# Patient Record
Sex: MALE | Race: BLACK OR AFRICAN AMERICAN | NOT HISPANIC OR LATINO | Employment: OTHER | URBAN - METROPOLITAN AREA
[De-identification: names, ages, dates, MRNs, and addresses within clinical notes are randomized per-mention and may not be internally consistent; named-entity substitution may affect disease eponyms.]

---

## 2018-07-09 ENCOUNTER — HOSPITAL ENCOUNTER (OUTPATIENT)
Dept: ADMINISTRATIVE | Facility: HOSPITAL | Age: 73
End: 2018-07-12
Attending: INTERNAL MEDICINE | Admitting: INTERNAL MEDICINE

## 2018-07-09 LAB
ABS NEUT (OLG): 3.74 X10(3)/MCL (ref 2.1–9.2)
ALBUMIN SERPL-MCNC: 3.2 GM/DL (ref 3.4–5)
ALBUMIN/GLOB SERPL: 1 {RATIO}
ALP SERPL-CCNC: 63 UNIT/L (ref 45–117)
ALT SERPL-CCNC: 43 UNIT/L (ref 16–61)
APTT PPP: 23.1 SECOND(S) (ref 21–30)
AST SERPL-CCNC: 48 UNIT/L (ref 15–37)
BASOPHILS # BLD AUTO: 0.03 X10(3)/MCL (ref 0–0.2)
BASOPHILS NFR BLD AUTO: 0.5 % (ref 0–0.9)
BILIRUB SERPL-MCNC: 1 MG/DL (ref 0.2–1)
BILIRUBIN DIRECT+TOT PNL SERPL-MCNC: 0.4 MG/DL (ref 0–0.2)
BILIRUBIN DIRECT+TOT PNL SERPL-MCNC: 0.6 MG/DL (ref 0–1)
BUN SERPL-MCNC: 8 MG/DL (ref 7–18)
CALCIUM SERPL-MCNC: 8.3 MG/DL (ref 8.5–10.1)
CHLORIDE SERPL-SCNC: 105 MMOL/L (ref 98–107)
CO2 SERPL-SCNC: 23 MMOL/L (ref 21–32)
CREAT SERPL-MCNC: 0.94 MG/DL (ref 0.7–1.3)
EOSINOPHIL # BLD AUTO: 0.21 X10(3)/MCL (ref 0–0.9)
EOSINOPHIL NFR BLD AUTO: 3.3 % (ref 0–6.5)
ERYTHROCYTE [DISTWIDTH] IN BLOOD BY AUTOMATED COUNT: 15.5 % (ref 11.5–17)
GLOBULIN SER-MCNC: 4 GM/DL (ref 2–4)
GLUCOSE SERPL-MCNC: 104 MG/DL (ref 74–106)
HCT VFR BLD AUTO: 43 % (ref 42–52)
HEMOCCULT SP1 STL QL: POSITIVE
HGB BLD-MCNC: 14.8 GM/DL (ref 14–18)
IMM GRANULOCYTES # BLD AUTO: 0.04 10*3/UL (ref 0–0.02)
IMM GRANULOCYTES NFR BLD AUTO: 0.6 % (ref 0–0.43)
INR PPP: 1.1 (ref 2–3)
LYMPHOCYTES # BLD AUTO: 1.61 X10(3)/MCL (ref 0.6–4.6)
LYMPHOCYTES NFR BLD AUTO: 25 % (ref 16.2–38.3)
MCH RBC QN AUTO: 28.6 PG (ref 27–31)
MCHC RBC AUTO-ENTMCNC: 34.4 GM/DL (ref 33–36)
MCV RBC AUTO: 83 FL (ref 80–94)
MONOCYTES # BLD AUTO: 0.82 X10(3)/MCL (ref 0.1–1.3)
MONOCYTES NFR BLD AUTO: 12.7 % (ref 4.7–11.3)
NEUTROPHILS # BLD AUTO: 3.74 X10(3)/MCL (ref 2.1–9.2)
NEUTROPHILS NFR BLD AUTO: 57.9 % (ref 49.1–73.4)
NRBC BLD AUTO-RTO: 0 % (ref 0–0.2)
PLATELET # BLD AUTO: 135 X10(3)/MCL (ref 130–400)
PMV BLD AUTO: 9.7 FL (ref 7.4–10.4)
POTASSIUM SERPL-SCNC: 3.8 MMOL/L (ref 3.5–5.1)
PROT SERPL-MCNC: 7 GM/DL (ref 6.4–8.2)
PROTHROMBIN TIME: 11 SECOND(S) (ref 9.3–11.4)
RBC # BLD AUTO: 5.18 X10(6)/MCL (ref 4.7–6.1)
SODIUM SERPL-SCNC: 136 MMOL/L (ref 136–145)
WBC # SPEC AUTO: 6.4 X10(3)/MCL (ref 4.5–11.5)

## 2018-07-10 LAB
ABS NEUT (OLG): 2.8 X10(3)/MCL (ref 2.1–9.2)
ANISOCYTOSIS BLD QL SMEAR: 2
EOSINOPHIL NFR BLD MANUAL: 4 % (ref 0–8)
ERYTHROCYTE [DISTWIDTH] IN BLOOD BY AUTOMATED COUNT: 15.8 % (ref 11.5–17)
HCT VFR BLD AUTO: 41.2 % (ref 42–52)
HGB BLD-MCNC: 13.7 GM/DL (ref 14–18)
LYMPHOCYTES NFR BLD MANUAL: 22 % (ref 13–40)
MACROCYTES BLD QL SMEAR: 2
MCH RBC QN AUTO: 28.9 PG (ref 27–31)
MCHC RBC AUTO-ENTMCNC: 33.3 GM/DL (ref 33–36)
MCV RBC AUTO: 86.9 FL (ref 80–94)
MONOCYTES NFR BLD MANUAL: 10 % (ref 2–11)
NEUTROPHILS NFR BLD MANUAL: 64 % (ref 47–80)
PLATELET # BLD AUTO: 116 X10(3)/MCL (ref 130–400)
PLATELET # BLD EST: NORMAL 10*3/UL
PLATELET # BLD EST: NORMAL 10*3/UL
PMV BLD AUTO: 10.1 FL (ref 7.4–10.4)
RBC # BLD AUTO: 4.74 X10(6)/MCL (ref 4.7–6.1)
WBC # SPEC AUTO: 5.4 X10(3)/MCL (ref 4.5–11.5)

## 2018-07-11 LAB
ABS NEUT (OLG): 2.08 X10(3)/MCL (ref 2.1–9.2)
BASOPHILS # BLD AUTO: 0 X10(3)/MCL (ref 0–0.2)
BASOPHILS NFR BLD AUTO: 1 %
EOSINOPHIL # BLD AUTO: 0.2 X10(3)/MCL (ref 0–0.9)
EOSINOPHIL NFR BLD AUTO: 4 %
ERYTHROCYTE [DISTWIDTH] IN BLOOD BY AUTOMATED COUNT: 15.6 % (ref 11.5–17)
HCT VFR BLD AUTO: 38.2 % (ref 42–52)
HGB BLD-MCNC: 12.4 GM/DL (ref 14–18)
LYMPHOCYTES # BLD AUTO: 1.5 X10(3)/MCL (ref 0.6–4.6)
LYMPHOCYTES NFR BLD AUTO: 33 %
MCH RBC QN AUTO: 28.2 PG (ref 27–31)
MCHC RBC AUTO-ENTMCNC: 32.5 GM/DL (ref 33–36)
MCV RBC AUTO: 87 FL (ref 80–94)
MONOCYTES # BLD AUTO: 0.6 X10(3)/MCL (ref 0.1–1.3)
MONOCYTES NFR BLD AUTO: 14 %
NEUTROPHILS # BLD AUTO: 2.08 X10(3)/MCL (ref 2.1–9.2)
NEUTROPHILS NFR BLD AUTO: 47 %
PLATELET # BLD AUTO: 105 X10(3)/MCL (ref 130–400)
PMV BLD AUTO: 10.6 FL (ref 9.4–12.4)
RBC # BLD AUTO: 4.39 X10(6)/MCL (ref 4.7–6.1)
WBC # SPEC AUTO: 4.4 X10(3)/MCL (ref 4.5–11.5)

## 2018-07-12 LAB
ABS NEUT (OLG): 2.7 X10(3)/MCL (ref 2.1–9.2)
BASOPHILS # BLD AUTO: 0 X10(3)/MCL (ref 0–0.2)
BASOPHILS NFR BLD AUTO: 0 %
EOSINOPHIL # BLD AUTO: 0.2 X10(3)/MCL (ref 0–0.9)
EOSINOPHIL NFR BLD AUTO: 4 %
ERYTHROCYTE [DISTWIDTH] IN BLOOD BY AUTOMATED COUNT: 15.7 % (ref 11.5–17)
HCT VFR BLD AUTO: 37.4 % (ref 42–52)
HGB BLD-MCNC: 12.2 GM/DL (ref 14–18)
LYMPHOCYTES # BLD AUTO: 1 X10(3)/MCL (ref 0.6–4.6)
LYMPHOCYTES NFR BLD AUTO: 22 %
MCH RBC QN AUTO: 28.5 PG (ref 27–31)
MCHC RBC AUTO-ENTMCNC: 32.6 GM/DL (ref 33–36)
MCV RBC AUTO: 87.4 FL (ref 80–94)
MONOCYTES # BLD AUTO: 0.7 X10(3)/MCL (ref 0.1–1.3)
MONOCYTES NFR BLD AUTO: 15 %
NEUTROPHILS # BLD AUTO: 2.7 X10(3)/MCL (ref 2.1–9.2)
NEUTROPHILS NFR BLD AUTO: 57 %
PLATELET # BLD AUTO: 107 X10(3)/MCL (ref 130–400)
PMV BLD AUTO: 10.6 FL (ref 9.4–12.4)
RBC # BLD AUTO: 4.28 X10(6)/MCL (ref 4.7–6.1)
WBC # SPEC AUTO: 4.7 X10(3)/MCL (ref 4.5–11.5)

## 2021-11-04 ENCOUNTER — HISTORICAL (OUTPATIENT)
Dept: ADMINISTRATIVE | Facility: HOSPITAL | Age: 76
End: 2021-11-04

## 2021-11-04 LAB
ALBUMIN SERPL-MCNC: 3.4 GM/DL (ref 3.4–4.8)
ALBUMIN/GLOB SERPL: 1 RATIO (ref 1.1–2)
ALP SERPL-CCNC: 67 UNIT/L (ref 40–150)
ALT SERPL-CCNC: 29 UNIT/L (ref 0–55)
AST SERPL-CCNC: 42 UNIT/L (ref 5–34)
BILIRUB SERPL-MCNC: 0.7 MG/DL
BILIRUBIN DIRECT+TOT PNL SERPL-MCNC: 0.3 MG/DL (ref 0–0.5)
BILIRUBIN DIRECT+TOT PNL SERPL-MCNC: 0.4 MG/DL (ref 0–0.8)
BUN SERPL-MCNC: 11.8 MG/DL (ref 8.4–25.7)
CALCIUM SERPL-MCNC: 9.1 MG/DL (ref 8.7–10.5)
CHLORIDE SERPL-SCNC: 104 MMOL/L (ref 98–107)
CO2 SERPL-SCNC: 27 MMOL/L (ref 23–31)
CREAT SERPL-MCNC: 0.71 MG/DL (ref 0.73–1.18)
ERYTHROCYTE [DISTWIDTH] IN BLOOD BY AUTOMATED COUNT: 14.4 % (ref 11.5–17)
GLOBULIN SER-MCNC: 3.4 GM/DL (ref 2.4–3.5)
GLUCOSE SERPL-MCNC: 97 MG/DL (ref 82–115)
HCT VFR BLD AUTO: 42.7 % (ref 42–52)
HGB BLD-MCNC: 14.2 GM/DL (ref 14–18)
MCH RBC QN AUTO: 28.5 PG (ref 27–31)
MCHC RBC AUTO-ENTMCNC: 33.3 GM/DL (ref 33–36)
MCV RBC AUTO: 85.7 FL (ref 80–94)
PLATELET # BLD AUTO: 146 X10(3)/MCL (ref 130–400)
PMV BLD AUTO: 11.1 FL (ref 9.4–12.4)
POTASSIUM SERPL-SCNC: 4.5 MMOL/L (ref 3.5–5.1)
PROT SERPL-MCNC: 6.8 GM/DL (ref 5.8–7.6)
RBC # BLD AUTO: 4.98 X10(6)/MCL (ref 4.7–6.1)
SARS-COV-2 RNA RESP QL NAA+PROBE: NOT DETECTED
SODIUM SERPL-SCNC: 139 MMOL/L (ref 136–145)
WBC # SPEC AUTO: 4.5 X10(3)/MCL (ref 4.5–11.5)

## 2021-11-08 ENCOUNTER — HISTORICAL (OUTPATIENT)
Dept: ADMINISTRATIVE | Facility: HOSPITAL | Age: 76
End: 2021-11-08
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
## 2022-04-30 NOTE — DISCHARGE SUMMARY
Patient:   Charu Cancino            MRN: [id number]            FIN: [id number]               Age:   73 years     Sex:  Male     :  1945   Associated Diagnoses:   None   Author:   Tripp Costa MD      Discharge Information      Discharge Summary Information   ADMIT/DISCHARGE DATE   Admit Date: 2018  Discharge Date: 2018     Physicians   Attending Physician - Igor CHRISTIANSON, Tripp  Consulting Physician - Taina CHRISTIANSON, Toro MARRERO  Consulting Physician - Pop CHRISTIANSON, Marcelo PHAM  Consulting Physician - Haile CHRISTIANSON, Song CORREIA       Discharge Diagnosis   Hematochezia, Likely Lower GI bleed, resolved      Mild bradycardia, asymptomatic      Hypertension, benign      Hyperlipidemia      CAD       Procedures   2018 - [id number] - Polypectomy  07/10/2018 - [id number] - Esophagogastroduodenoscopy  2018 -  - Colonoscopy  2018 -  - Biopsy Gastrointestinal     Immunizations   No immunizations recorded for this visit.     Discharge Medications   Continue  aspirin (aspirin 81 mg oral tablet), Daily  atorvastatin (atorvastatin 40 mg oral tablet) 80 mg, Oral, Daily  brimonidine-timolol ophthalmic (COMBIGAN EYE DROPS) 1 drop(s), Eye-Both, q12hr  carvedilol (Coreg 3.125 mg oral tablet) 3.125 mg, Oral, BIDWMeal  lisinopril (LISINOPRIL   TAB 20MG) 20 mg, Oral, Daily  omeprazole (omeprazole 40 mg oral DR capsule) 40 mg, Oral, Daily  ranolazine (RANEXA       TAB 500MG) 500 mg, Oral, BID  Discontinue  acetaminophen-HYDROcodone (HYDROCO/APAP TAB 5-325MG)  carvedilol (CARVEDILOL   TAB 6.25MG)  cyclobenzaprine (CYCLOBENZAPRINE 10 MG TABLET) 10 mg, Oral, TID  finasteride (finasteride 5 mg oral tablet), Daily  metoprolol (metoprolol tartrate 50 mg oral tab)  ranolazine (Ranexa 1000 mg oral tablet, extended release), BID  sucralfate (sucralfate 1 g oral tablet) 1 gm, Oral, QID  ticagrelor (BRILINTA     TAB 60MG) 60 mg, Oral, BID  ticagrelor (Brilinta 90 mg oral  tablet) 1 tab, Oral, BID        Education   Discharge - Home, Give all scheduled vaccinations prior to discharge.   Discharge Activity - Activity as Tolerated   Discharge Diet - Regular         Followup   Song Be, on 07/16/2018        Hospital Course   Hospital Course   73-year-old black male who is on Brillinta and aspirin with a history of CAD presented to the emergency department at [facility] due to rectal bleeding that started this morning with no previous history.  Patient was transferred to [facility] for GI services.  Patient reported a total of 4 large bloody bowel movements on day of admit with minimal stool.  Reported that the blood is bright red.  Per ED note, rectal exam revealed gross blood.  H&H was stable.  Patient was accepted to hospital medicine services for medical management and GI consultation. H&H & BMs were monitored. EGD on 7/10 was negative for acute findings. Colonoscopy was done on 7/11/18 and a large polyp suspicious for malignancy was seen abd biopsied. Pt was started on po diet. Stool was non bloody and H&H has been stable. CT abdomen was done and did not show any acute findings. Pt was seen by surgery team. After discussions with the family Dr Be, colorectal surgeon recommend out pt F/U in his clinic.   Pt stable and asymptomatic. Dc to home today.  Diet: Cardiac  Meds: Per dc med rec  F/U with Dr Be as scheduled  For further questions contact hospitalist office  DC 31 mts   .        Physical Examination   General:  Alert and oriented, No acute distress.    Respiratory:  Lungs are clear to auscultation, Breath sounds are equal.    Cardiovascular:  Normal rate, Regular rhythm, No murmur.    Gastrointestinal:  Soft, Non-tender, Non-distended, Normal bowel sounds.    Neurologic:  Alert, Oriented, No focal deficits, Cranial Nerves II-XII are grossly intact.       Results Review   General results   Today's results   7/12/2018 3:32 CDT        WBC                       4.7 x10(3)/mcL                             RBC                       4.28 x10(6)/mcL  LOW                             Hgb                       12.2 gm/dL  LOW                             Hct                       37.4 %  LOW                             Platelet                  107 x10(3)/mcL  LOW                             MCV                       87.4 fL                             MCH                       28.5 pg                             MCHC                      32.6 gm/dL  LOW                             RDW                       15.7 %                             MPV                       10.6 fL                             Abs Neut                  2.70 x10(3)/mcL                             Neutro Auto               57 %  NA                             Lymph Auto                22 %  NA                             Mono Auto                 15 %  NA                             Eos Auto                  4 %  NA                             Abs Eos                   0.2 x10(3)/mcL                             Basophil Auto             0 %  NA                             Abs Neutro                2.70 x10(3)/mcL                             Abs Lymph                 1.0 x10(3)/mcL                             Abs Mono                  0.7 x10(3)/mcL                             Abs Baso                  0.0 x10(3)/mcL        Discharge Plan   Education and Follow-up   Counseled: patient, regarding diagnosis, regarding treatment, regarding medications.

## 2022-04-30 NOTE — CONSULTS
Patient:   Charu Cancino            MRN: [id number]            FIN: [id number]               Age:   73 years     Sex:  Male     :  1945   Associated Diagnoses:   None   Author:   Shefali Figueroa      Chief Complaint   We have been consulted by Dr. Costa to evaluate this patient for hematochezia.       History of Present Illness   Mr. Cancino is a 73-year-old AAM unknown to our group.  He has a history of CAD, MI, hypercholesterolemia, and hypertension.  He takes Brilinta and aspirin 81 mg daily.  For the past year or so, he has been having postprandial BMs, but he says that it is not really diarrhea.  Yesterday morning, he had a dark BM with bright red blood in the toilet.  He had another episode at home, and he presented to [facility].  He had another episode there, and he was then transferred here for GI evaluation.  He had 2 more bouts of hematochezia since admission here.  He and his nurse describe dark red and fresh red blood with what appears to be dark stool.  He has not had any abdominal pain.  As above, he takes Brilinta and aspirin daily.  He denies taking NSAIDs otherwise.  He denies nausea, vomiting, hematemesis, reflux, heartburn, or dysphagia.  He was taking Pepto-Bismol at one time due to the postprandial stooling, but he says he has not taken this in a while.  He does admit to about a 20 pound weight loss in the past 6 months.  He says that his appetite is good. Hemoglobin was 14.8 g/dL on admission and is 13.7 g/dL today.  Stool is obviously Hemoccult positive.  He denies prior EGD or colonoscopy.      Health Status   Allergies:    Allergic Reactions (All)  No Known Allergies,    Allergies (1) Active Reaction  No Known Allergies None Documented     Current medications:  (Selected)   Inpatient Medications  Ordered  Coreg 3.125 mg oral tablet: 3.125 mg, form: Tab, Oral, BIDWMeal, first dose 07/10/18 8:00:00 CDT  Protonix: 40 mg, form: Injection, IV Slow, Daily, first dose 18  17:24:00 CDT, STAT  Sodium Chloride 0.9% intravenous solution 1,000 mL: 1,000 mL, 1,000 mL, IV, 75 mL/hr, start date 18 17:24:00 CDT  Zofran: 4 mg, form: Injection, IV Push, q4hr PRN for nausea, first dose 18 17:24:00 CDT  atorvastatin 40 mg oral tablet: 80 mg, form: Tab, Oral, Daily, first dose 07/10/18 17:00:00 CDT  metoprolol tartrate 50 mg oral tab: 50 mg, form: Tab, Oral, Daily, first dose 07/10/18 9:00:00 CDT  morphine 4 mg/mL preservative-free intravenous solution: 4 mg, form: Injection, IV Push, q4hr PRN for pain, severe, first dose 18 17:24:00 CDT, ( > 7 on pain scale)  Prescriptions  Prescribed  Brilinta 90 mg oral tablet: 1 tab, Oral, BID, # 60 tab(s), 10 Refill(s)  Coreg 3.125 mg oral tablet: 3.125 mg = 1 tab(s), Oral, BIDWMeal, # 60 tab(s), 10 Refill(s)  atorvastatin 40 mg oral tablet: 80 mg = 2 tab(s), Oral, Daily, # 60 tab(s), 10 Refill(s)  omeprazole 40 mg oral DR capsule: 40 mg = 1 cap(s), Oral, Daily, # 30 cap(s), 0 Refill(s)  sucralfate 1 g oral tablet: 1 gm = 1 tab(s), Oral, QID, # 120 tab(s), 0 Refill(s)  Documented Medications  Documented  BRILINTA     TAB 60M mg = 1 tab(s), Oral, BID  CARVEDILOL   TAB 6.25MG:   COMBIGAN EYE DROPS: 1 drop(s), Eye-Both, q12hr  CYCLOBENZAPRINE 10 MG TABLET: 10 mg = 1 tab(s), Oral, TID  HYDROCO/APAP TAB 5-325MG:   LISINOPRIL   TAB 20M mg = 1 tab(s), Oral, Daily  RANEXA       TAB 500M mg = 1 tab(s), Oral, BID  Ranexa 1000 mg oral tablet, extended release: BID, 0 Refill(s)  aspirin 81 mg oral tablet: Daily, 0 Refill(s)  finasteride 5 mg oral tablet: Daily, 0 Refill(s)  metoprolol tartrate 50 mg oral tab: 0 Refill(s)      Histories   Past Medical History:    Resolved  MI (myocardial infarction) ([id number]):  Resolved.  Elevated cholesterol with elevated triglycerides ([id number]):  Resolved.  HTN (hypertension) ([id number]):  Resolved.   Procedure history:     Left Heart Cath w/COR Angio Ventriculography on 1/13/2016 at 70 Years.  Comments:  1/13/2016 05:58 - Nico Thomas RN  auto-populated from documented surgical case  Stent Transluminal Revasc AMI Single on 1/13/2016 at 70 Years.  Comments:  1/13/2016 05:58 - Nico Thomas RN  auto-populated from documented surgical case      SOCIAL HX:  Remote tobacco use.  He drinks about 2 16-ounce beers daily.     FAMILY HX:  No known hx of colon or gastric cancer      Review of Systems   Constitutional:  No fever, No chills, No sweats, No weakness.    Respiratory:  No shortness of breath, No cough, No hemoptysis, No wheezing.    Cardiovascular:  No chest pain, No palpitations, No peripheral edema, No syncope.    Gastrointestinal:  See HPI .    Musculoskeletal:  No back pain, No muscle pain, No decreased range of motion.    Integumentary:  No rash, No pruritus, No skin lesion.    Neurologic:  No confusion, No numbness, No tingling.    All other systems are negative      Physical Examination   General:  Alert and oriented, No acute distress.       Vital Signs (last 24 hrs)_____  Last Charted___________  Temp Oral     36.9 DegC  (JUL 10 07:20)  Heart Rate Peripheral   L 50bpm  (JUL 10 07:20)  Resp Rate         18 br/min  (JUL 10 07:20)  SBP      121 mmHg  (JUL 10 07:20)  DBP      74 mmHg  (JUL 10 07:20)  SpO2      100 %  (JUL 10 07:20)  Weight      69.5 kg  (JUL 09 20:06)  Height      185.42 cm  (JUL 09 20:06)  BMI      20.21  (JUL 09 20:06)     Eye:  Extraocular movements are intact, Sclerae are nonicteric.    HENT:  Normocephalic, Oral mucosa is moist, poor dentition.    Respiratory:  Respirations are non-labored.    Cardiovascular:  Normal rate, Regular rhythm.    Gastrointestinal:  Soft, Non-tender, Non-distended, Normal bowel sounds.    Musculoskeletal:  Normal range of motion.    Integumentary:  Warm, Dry, Pink, Intact.    Neurologic:  Alert, Oriented, No focal deficits.    Psychiatric:  Cooperative, Appropriate mood & affect.        Review / Management   Results review:  All Results   7/10/2018 3:32 CDT       WBC                       5.4 x10(3)/mcL                             RBC                       4.74 x10(6)/mcL                             Hgb                       13.7 gm/dL  LOW                             Hct                       41.2 %  LOW                             Platelet                  116 x10(3)/mcL  LOW                             MCV                       86.9 fL                             MCH                       28.9 pg                             MCHC                      33.3 gm/dL                             RDW                       15.8 %                             MPV                       10.1 fL                             Abs Neut                  2.80 x10(3)/mcL                             Neut Man                  64 %                             Lymph Man                 22 %                             Monocyte Man              10 %                             Eos Man                   4 %                             Platelet Est              l                             Anisocyte                 2  NA                             Macrocyte                 2  NA    7/9/2018 15:08 CDT       Occult Bld Stl            Positive    7/9/2018 14:11 CDT       Occult Bld Stl            Positive    7/9/2018 13:40 CDT       PT                        11.0 second(s)                             INR                       1.1  LOW                             PTT                       23.1 second(s)                             Sodium Lvl                136 mmol/L                             Potassium Lvl             3.8 mmol/L                             Chloride                  105 mmol/L                             CO2                       23.0 mmol/L                             Calcium Lvl               8.3 mg/dL  LOW                             Glucose Lvl               104 mg/dL                             BUN                        8.0 mg/dL                             Creatinine                0.94 mg/dL                             eGFR-AA                   >60 mL/min/1.73 m2  NA                             eGFR-JONY                  >60 mL/min/1.73 m2  NA                             Bili Total                1.0 mg/dL                             Bili Direct               0.40 mg/dL  HI                             Bili Indirect             0.60 mg/dL                             AST                       48 unit/L  HI                             ALT                       43 unit/L                             Alk Phos                  63 unit/L                             Total Protein             7.0 gm/dL                             Albumin Lvl               3.2 gm/dL  LOW                             Globulin                  4 gm/dL                             A/G Ratio                 1  NA  .    Radiology results   Rad Results (ST)     6/5/18    * Final Report *    Reason For Exam  Abdominal Pain    Radiology Report     EXAM: CT Abdomen and Pelvis W Contrast     INDICATION: Abdominal Pain, diarrhea, left shoulder pain, dizziness     TECHNIQUE: Axial computed tomographic imaging of the abdomen and  pelvis is obtained following the administration of 100 mL Isovue-370  intravenous contrast. Automated exposure control is utilized to reduce  patient radiation dose.     COMPARISON: None     FINDINGS: Calcified pleural plaques and atelectasis/scarring are noted  along the left lung base. The visualized right lung base is clear. The  heart is not enlarged and there is no pericardial effusion. Calcified  atherosclerotic plaque is noted along the abdominal aorta and its  major branches. The inferior vena cava is within normal limits as  seen.     The liver is normal in size and contour without focal lesion. The  gallbladder is nondistended. There is no biliary ductal dilatation.  The spleen is nonenlarged. The pancreas is within  normal limits, with  2 coarse calcifications along the pancreatic body and head.     The bilateral adrenal glands are unremarkable. 2 tiny cortical  hypodensities are noted in the left kidney (see series 602, image 63),  which are too small to accurately characterize and likely represent  cysts. The kidneys are otherwise normally enhancing without focal mass  or hydronephrosis. The bilateral ureters are normal in course and  caliber. The urinary bladder is within normal limits as seen. The  prostate gland measures 4.6 x 4.3 x 4.4 cm (TV, AP, CC).     There is no free air or free fluid. No dilated loops of bowel are  identified. There is mild hyperenhancement along the transverse and  descending colon, which are decompressed. The appendix is unremarkable  with air in the lumen.     No pathologically enlarged lymph nodes are identified. There is no  aggressive lytic or blastic osseous lesion. Degenerative changes are  noted along the spine, with prominent Schmorl's nodes along the  inferior endplate of L2 and L3, as well as along the superior endplate  of L3.        IMPRESSION:   1. Mild hyperenhancement of the transverse and descending colon, which  may relate to mild colitis in the appropriate clinical setting.  2. Calcified pleural plaques along the left lower lobe with left  basilar scarring/atelectasis.         Impression and Plan    73-year-old AAM unknown to our group.  He has a history of CAD, MI, hypercholesterolemia, and hypertension.  He takes Brilinta and aspirin 81 mg daily.  He presented with hematochezia.    1.  Painless hematochezia  2.  ? Melena    Hemoglobin 14.813.7 g/dL  + FOBT    -Brilinta and aspirin held  -Monitor stools  -Monitor H/H  -EGD today, rule out upper GI source of bleeding.  Discussed with Dr. Lord.  -We will make further recommendations pending EGD findings.  He does need a screening colonoscopy regardless, inpatient vs outpatient.      Professional Services   Thank you for this  consult, please call if you have any questions or concerns.

## 2022-04-30 NOTE — ED PROVIDER NOTES
Patient:   Charu Cancino            MRN: [id number]            FIN: [id number]               Age:   73 years     Sex:  Male     :  1945   Associated Diagnoses:   Acute lower GI bleeding   Author:   Rosibel Contreras MD      Basic Information   History source: Patient.   Arrival mode: Private vehicle.   History limitation: None.   Additional information: Patient's physician(s): Radiologist Dr. Fabian, no primary care provider, Chief Complaint from Nursing Triage Note : Chief Complaint   2018 13:11 CDT       Chief Complaint           pt to er c/o blood in stool onset this morning.  .      History of Present Illness   The patient presents with rectal bleeding and Mr. Cancino is a 73-year-old -American male on Hodgeman and aspirin with a history of CAD who states that he started having rectal bleeding this morning.  He has taken on his phone of the toilet full of blood.  He states he has 3 large bowel movements today each time mostly blood and minimal stool..  The onset was 1  days ago.  The course/duration of symptoms is fluctuating in intensity.  Vomiting: bright red and degree moderate.  Rectal bleed: grossly bloody.  The exacerbating factor is none.  The relieving factor is none.  Risk factors consist of Takes Brillinta and aspirin.  Associated symptoms: denies abdominal pain and denies rectal pain.        Review of Systems   Gastrointestinal symptoms:  Rectal bleeding, no abdominal pain, no rectal pain.              Additional review of systems information: All other systems reviewed and otherwise negative.      Health Status   Allergies:    Allergic Reactions (Selected)  No Known Allergies,    Allergies (1) Active Reaction  No Known Allergies None Documented  .   Medications:  (Selected)   Prescriptions  Prescribed  Brilinta 90 mg oral tablet: 1 tab, Oral, BID, # 60 tab(s), 10 Refill(s)  Coreg 3.125 mg oral tablet: 3.125 mg = 1 tab(s), Oral, BIDWMeal, # 60 tab(s), 10  Refill(s)  atorvastatin 40 mg oral tablet: 80 mg = 2 tab(s), Oral, Daily, # 60 tab(s), 10 Refill(s)  omeprazole 40 mg oral DR capsule: 40 mg = 1 cap(s), Oral, Daily, # 30 cap(s), 0 Refill(s)  sucralfate 1 g oral tablet: 1 gm = 1 tab(s), Oral, QID, # 120 tab(s), 0 Refill(s)  Documented Medications  Documented  BRILINTA     TAB 60M mg = 1 tab(s), Oral, BID  COMBIGAN EYE DROPS: 1 drop(s), Eye-Both, q12hr  CYCLOBENZAPRINE 10 MG TABLET: 10 mg = 1 tab(s), Oral, TID  HYDROCO/APAP TAB 5-325MG:   LISINOPRIL   TAB 20M mg = 1 tab(s), Oral, Daily  RANEXA       TAB 500M mg = 1 tab(s), Oral, BID  Ranexa 1000 mg oral tablet, extended release: BID, 0 Refill(s)  aspirin 81 mg oral tablet: Daily, 0 Refill(s)  finasteride 5 mg oral tablet: Daily, 0 Refill(s)  metoprolol tartrate 50 mg oral tab: 0 Refill(s).      Past Medical/ Family/ Social History   Medical history:    Resolved  MI (myocardial infarction) ([id number]):  Resolved.  Elevated cholesterol with elevated triglycerides ([id number]):  Resolved.  HTN (hypertension) ([id number]):  Resolved., Reviewed as documented in chart.   Surgical history:    Left Heart Cath w/COR Angio Ventriculography on 2016 at 70 Years.  Comments:  2016 05:58 - Nico Thomas RN  auto-populated from documented surgical case  Stent Transluminal Revasc AMI Single on 2016 at 70 Years.  Comments:  2016 05:58 - Nico Thomas RN  auto-populated from documented surgical case, Reviewed as documented in chart.   Family history: Not significant.   Social history: Tobacco use: Denies.   Problem list:    CAD  HTN  Prior MI  HLD, per nurse's notes.      Physical Examination               Vital Signs   Vital Signs   2018 13:11 CDT       Temperature Oral          37.0 DegC                             Temperature Oral (calculated)             98.60 DegF                             Peripheral Pulse Rate      67 bpm                             Respiratory Rate          18 br/min                             SpO2                      97 %                             Oxygen Therapy            Room air                             Systolic Blood Pressure   102 mmHg                             Diastolic Blood Pressure  68 mmHg  .   Measurements   7/9/2018 13:11 CDT       Weight Dosing             77 kg                             Weight Measured and Calculated in Lbs     169.75 lb                             Weight Estimated          77 kg                             Height/Length Dosing      185.42 cm                             Height/Length Estimated   185.42 cm                             Body Mass Index Estimated 22.4 kg/m2  .   Basic Oxygen Information   7/9/2018 13:11 CDT       SpO2                      97 %                             Oxygen Therapy            Room air  .   General:  Alert, no acute distress.    Skin:  Warm, dry.    Eye:  Pupils are equal, round and reactive to light.   Cardiovascular:  Regular rate and rhythm.   Respiratory:  Lungs are clear to auscultation.   Gastrointestinal:  Soft, Nontender, Non distended, Normal bowel sounds, No organomegaly, Rectal exam performed with nurse in the room.  He has no hemorrhoids, no lesions, no tenderness.  He has gross red blood on exam., Mass: Negative.    Musculoskeletal:  Ambulatory without assistance.   Neurological:  Alert and oriented to person, place, time, and situation, No focal neurological deficit observed.    Psychiatric:  Cooperative, appropriate mood & affect.       Medical Decision Making   Differential Diagnosis:  GI bleed, rectal bleeding, gastroenteritis.    Documents reviewed:  Emergency department nurses' notes.   Results review:  Lab results : Lab View   7/9/2018 15:08 CDT       Occult Bld Stl            Positive    7/9/2018 14:11 CDT       Occult Bld Stl            Positive    7/9/2018 13:40 CDT       Sodium Lvl                136 mmol/L                              Potassium Lvl             3.8 mmol/L                             Chloride                  105 mmol/L                             CO2                       23.0 mmol/L                             Calcium Lvl               8.3 mg/dL  LOW                             Glucose Lvl               104 mg/dL                             BUN                       8.0 mg/dL                             Creatinine                0.94 mg/dL                             eGFR-AA                   >60 mL/min/1.73 m2  NA                             eGFR-JONY                  >60 mL/min/1.73 m2  NA                             Bili Total                1.0 mg/dL                             Bili Direct               0.40 mg/dL  HI                             Bili Indirect             0.60 mg/dL                             AST                       48 unit/L  HI                             ALT                       43 unit/L                             Alk Phos                  63 unit/L                             Total Protein             7.0 gm/dL                             Albumin Lvl               3.2 gm/dL  LOW                             Globulin                  4 gm/dL                             A/G Ratio                 1  NA                             PT                        11.0 second(s)                             INR                       1.1  LOW                             PTT                       23.1 second(s)                             WBC                       6.4 x10(3)/mcL                             RBC                       5.18 x10(6)/mcL                             Hgb                       14.8 gm/dL                             Hct                       43.0 %                             Platelet                  135 x10(3)/mcL                             MCV                       83.0 fL                             MCH                       28.6 pg                             MCHC                       34.4 gm/dL                             RDW                       15.5 %                             MPV                       9.7 fL                             Abs Neut                  3.74 x10(3)/mcL                             Neutro Auto               57.9 %                             Lymph Auto                25.0 %                             Mono Auto                 12.7 %  HI                             Eos Auto                  3.3 %                             Abs Eos                   0.21 x10(3)/mcL                             Basophil Auto             0.5 %                             Abs Neutro                3.74 x10(3)/mcL                             Abs Lymph                 1.61 x10(3)/mcL                             Abs Mono                  0.82 x10(3)/mcL                             Abs Baso                  0.03 x10(3)/mcL                             NRBC%                     0.0 %                             IG%                       0.600 %  HI                             IG#                       0.0400  HI  .      Reexamination/ Reevaluation   Time: 7/9/2018 15:42:00 .   Notes: No bleeding since being in the emergency room.  No pain, vital signs.      Impression and Plan   Diagnosis   Acute lower GI bleeding (BLT69-UK K92.2)      Calls-Consults   -  7/9/2018 15:41:00 , Igor CHRISTIANSON, Tripp, Case discussed with Vidya ANNE.    Plan   Condition: Stable.    Disposition: Admit time  7/9/2018 15:41:00, Place in Observation Telemetry Unit.    Counseled: Patient, Regarding diagnosis, Regarding diagnostic results, Regarding treatment plan, Patient indicated understanding of instructions.

## 2022-05-04 NOTE — HISTORICAL [facility] C[facility]ER
This is a historical note converted from [employer]. Formatting and pictures may have been removed.  Please reference [employer] for original formatting and attached multimedia. Chief Complaint  pt to er c/o blood in stool onset this morning.  Reason for Consultation  3 cm cecal polyp  History of Present Illness  73M admitted 7/9 for new onset of bright red blood per rectum. Pt reports that Monday morning when he had a bowel movement he noted bright red blood in the toilet bowl, x3.? At that point he elected to come to the emergency room.? He denies any previous episodes of this kind, but did note one black stool ~1 month ago.? He denies any emesis, bloody or otherwise. He has lost ~20 pounds? over the last 4-5 months.? He denies any fevers, chills, headaches, nausea, urinary complaints, fatigue, palpitations, dizziness,?cough, shortness of breath, or?changes in appetite.? Unsure about family history but thinks his brother had something like this and may have had colectomy.?? Pt is on brilinta and ASA for h/o drug eluting coronary stents.  ?  Review of Systems  12 point review of systems was conducted and was negative other than as above.  Physical Exam  Vitals & Measurements  T:?36.4? ?C (Oral)? TMIN:?36.1? ?C (Tympanic)? TMAX:?36.8? ?C (Oral)? HR:?62(Peripheral)? RR:?18? BP:?159/88? SpO2:?98%?  Afebrile, VSS  Gen: NAD, AAOx3  HEENT: NCAT, PERRLA, EOMI  CV: Regular rate,? + gallop?rhythm  Resp: Clear to auscultation bilaterally, no wheezes or rhonchi  Abdomen: soft, nontender, nondistended, no masses palpated  Extremities: no clubbing or edema, 1+ DP/PT bilaterally  ?  Assessment/Plan  73M with bright red blood per rectum and 3 cm cecal polyp  - Follow up path, will evaluate need for surgical resection afterwards  - Pt may eat a regular diet from our standpoint  - Will follow. Thank you very much for the consult  ?  She Aburto MD  -1, [facility]?   Problem List/Past Medical History  Ongoing  No  qualifying data  Historical  Elevated cholesterol with elevated triglycerides  HTN (hypertension)  MI (myocardial infarction)  Procedure/Surgical History  Biopsy Gastrointestinal (07/11/2018), Colonoscopy (07/11/2018), Polypectomy (07/11/2018), Esophagogastroduodenoscopy (07/10/2018), Dilation of Coronary Artery, One Site with Drug-eluting Intraluminal Device, Percutaneous Approach (01/13/2016), Fluoroscopy of Left Heart using Low Osmolar Contrast (01/13/2016), Fluoroscopy of Multiple Coronary Arteries using Low Osmolar Contrast (01/13/2016), Left Heart Cath w/COR Angio Ventriculography (01/13/2016), Measurement of Cardiac Sampling and Pressure, Left Heart, Percutaneous Approach (01/13/2016), Stent Transluminal Revasc AMI Single (01/13/2016).  Medications  Inpatient  atorvastatin 40 mg oral tablet, 80 mg= 2 tab(s), Oral, Daily  Coreg 3.125 mg oral tablet, 3.125 mg= 1 tab(s), Oral, BIDWMeal  loratadine 10 mg oral tablet, 10 mg= 1 tab(s), Oral, Daily  morphine 4 mg/mL preservative-free intravenous solution, 4 mg= 1 mL, IV Push, q4hr, PRN  Plasmalyte 1,000 mL, 1000 mL, IV  Protonix 40 mg ORAL enteric coated tablet, 40 mg= 1 tab(s), Oral, Daily  Sodium Chloride 0.9% intravenous solution 1,000 mL, 1000 mL, IV  Zofran, 4 mg= 2 mL, IV Push, q4hr, PRN  Home  aspirin 81 mg oral tablet, Daily  atorvastatin 40 mg oral tablet, 80 mg= 2 tab(s), Oral, Daily, 10 refills,? ?Not taking  BRILINTA TAB 60MG, 60 mg= 1 tab(s), Oral, BID  Coreg 3.125 mg oral tablet, 3.125 mg= 1 tab(s), Oral, BIDWMeal, 10 refills  metoprolol tartrate 50 mg oral tab  RANEXA TAB 500MG, 500 mg= 1 tab(s), Oral, BID  Allergies  No Known Allergies  Social History  Alcohol - Medium Risk, 10/20/2015  Current, Beer, Daily, Previous treatment: None., 01/13/2016  Substance Abuse - Denies Substance Abuse, 01/13/2016  Never, 06/05/2018  Tobacco - Denies Tobacco Use, 10/20/2015  Former smoker, Cigarettes, 10 per day. Started age 15.0 Years. Stopped age 42 Years.  Previous treatment: None. Ready to change: Yes. Household tobacco concerns: Yes., 01/13/2016  Family History  unknown  possible colon mass in brother  Lab Results  Test Name Test Result Date/Time   Sodium Lvl 136 mmol/L 07/09/2018 13:40 CDT   Potassium Lvl 3.8 mmol/L 07/09/2018 13:40 CDT   Chloride 105 mmol/L 07/09/2018 13:40 CDT   CO2 23.0 mmol/L 07/09/2018 13:40 CDT   Calcium Lvl 8.3 mg/dL (Low) 07/09/2018 13:40 CDT   Glucose Lvl 104 mg/dL 07/09/2018 13:40 CDT   BUN 8.0 mg/dL 07/09/2018 13:40 CDT   Creatinine 0.94 mg/dL 07/09/2018 13:40 CDT   ALT 43 unit/L 07/09/2018 13:40 CDT   Alk Phos 63 unit/L 07/09/2018 13:40 CDT   Total Protein 7.0 gm/dL 07/09/2018 13:40 CDT   Albumin Lvl 3.2 gm/dL (Low) 07/09/2018 13:40 CDT   Globulin 4 gm/dL 07/09/2018 13:40 CDT   A/G Ratio 1 07/09/2018 13:40 CDT   PT 11.0 second(s) 07/09/2018 13:40 CDT   INR 1.1 (Low) 07/09/2018 13:40 CDT   PTT 23.1 second(s) 07/09/2018 13:40 CDT   WBC 4.4 x10(3)/mcL (Low) 07/11/2018 04:15 CDT   RBC 4.39 x10(6)/mcL (Low) 07/11/2018 04:15 CDT   Hgb 12.4 gm/dL (Low) 07/11/2018 04:15 CDT   Hct 38.2 % (Low) 07/11/2018 04:15 CDT   Platelet 105 x10(3)/mcL (Low) 07/11/2018 04:15 CDT   MCV 87.0 fL 07/11/2018 04:15 CDT   Diagnostic Results  Normal EGD  ?   ColonoscopY:  30 mm, non-bleeding polyp in the proximal ascending colon. Biopsied. - Two 3 to 5 mm polyps in the proximal descending colon and in the proximal transverse colon, removed with a cold snare. Resected and retrieved. - Diverticulosis in the sigmoid colon     [1]?Colonoscopy; Person Marcelo CHRISTIANSON 07/11/2018 06:52 CDT   Seen and examined. Agree with above. Colonoscopy findings noted. Will await path results to determine extent of resection.

## 2022-05-14 NOTE — OP NOTE
DATE OF SURGERY:    11/08/2021    SURGEON:  Frank Chun MD    TIME:  8:58 a.m.    PREOPERATIVE DIAGNOSIS:  Right inguinal hernia.    POSTOPERATIVE DIAGNOSIS:  Indirect right inguinal hernia.    PROCEDURE:  Ashley right inguinal hernia repair with 4 x 1-inch Marlex mesh.    ANESTHESIA:  General.    INDICATIONS:  Patient is a 76-year-old Black male.  Recently finished radiation treatments for prostate cancer.  He has a fairly large right inguinal hernia which has really been bothering him lately.  Physical exam:  He had a right inguinal hernia which reduced on its own.  Left side was normal.  He was therefore scheduled for right inguinal hernia repair.    PROCEDURE IN DETAIL:  After proper informed consent was signed, patient was taken to the operating room, placed on the operating room table in a supine position.  After general endotracheal anesthesia was obtained, the abdomen and both groins were prepped and draped in the usual sterile fashion.  Right groin incision was made 2 fingerbreadths above the inguinal ligament from the pubis out towards the anterior superior iliac spine.  Incision was 3 inches in length, carried down through the subcutaneous tissue, through Nitza fascia to the external oblique fascial layer, which was dissected free.  External ring was identified.  External oblique fascial layer was opened in the direction of its fibers, thus opening the external ring.  Superior and inferior external oblique flaps were created.  Ilioinguinal nerve was identified, retracted out of the field.  At this point, the patient had a very small, rudimentary ilioinguinal nerve, and therefore it was transected.  The cord structures were dissected free at the pubis and dissected back down to the internal ring.  At this point, a cremasteric muscle was dissected off the cord structures.  Patient had an indirect hernia sac which was identified and carefully dissected off the cord structures.  Vas  deferens as well as all the cord structures were identified and carefully preserved.  The hernia sac was dissected back down to the internal ring and then opened.  There were no organs within the hernia sac.  Hernia sac was then ligated at its neck with a 2-0 silk pursestring stitch and then transected at the neck.  Stump of the hernia sac was allowed to retract back into the abdomen.  At this point, a Ashley repair was performed using a 4 x 1-inch Marlex mesh.  Medially, the mesh was sewn to the lacunar ligament with a 2-0 Prolene stitch.  Inferiorly, the mesh was tacked to the shelving edge of the inguinal ligament with a VersaTack stapler.  Superiorly, the mesh was tacked to the internal oblique transversalis fascial layer, again, with a VersaTack stapler.  Laterally, both limbs of the mesh were sewn to the shelving edge of the inguinal ligament with a 2-0 Prolene stitch.  The mesh was then snugged around the cord with a 2-0 Prolene stitch.  Had very good coverage of the hernial defect, made sure that there was ample room for the cord to run through the mesh.  The wound was then copiously irrigated.  Cord structures were placed back in the inguinal canal.  External oblique fascial layer was then reapproximated with a running 0 Vicryl suture.  Nitza's was reapproximated with a running 3-0 Vicryl.  Skin was reapproximated with a running 4-0 subcuticular suture.  Wound was injected with Exparel.  Sterile dressings were applied.  Patient tolerated the procedure well.  There were no complications.  He was taken back to recovery room in good condition.        ______________________________  Frank Chun MD    [facility]  DD:  11/08/2021  Time:  09:03AM  DT:  11/08/2021  Time:  10:55AM  Job #:  [id number]    cc: Frank Chun MD

## 2022-06-06 ENCOUNTER — HOSPITAL ENCOUNTER (EMERGENCY)
Facility: HOSPITAL | Age: 77
Discharge: HOME | End: 2022-06-06
Attending: STUDENT IN AN ORGANIZED HEALTH CARE EDUCATION/TRAINING PROGRAM
Payer: COMMERCIAL

## 2022-06-06 VITALS
SYSTOLIC BLOOD PRESSURE: 161 MMHG | RESPIRATION RATE: 18 BRPM | DIASTOLIC BLOOD PRESSURE: 80 MMHG | WEIGHT: 156 LBS | OXYGEN SATURATION: 98 % | HEIGHT: 73 IN | BODY MASS INDEX: 20.67 KG/M2 | TEMPERATURE: 98 F | HEART RATE: 51 BPM

## 2022-06-06 DIAGNOSIS — R42 DIZZINESS: Primary | ICD-10-CM

## 2022-06-06 DIAGNOSIS — H66.92 LEFT OTITIS MEDIA, UNSPECIFIED OTITIS MEDIA TYPE: ICD-10-CM

## 2022-06-06 LAB
ABS NEUT CALC (OHS): 2.32 X10(3)/MCL (ref 2.1–9.2)
ALBUMIN SERPL-MCNC: 3.5 GM/DL (ref 3.4–4.8)
ALBUMIN/GLOB SERPL: 1 RATIO (ref 1.1–2)
ALP SERPL-CCNC: 62 UNIT/L (ref 40–150)
ALT SERPL-CCNC: 37 UNIT/L (ref 0–55)
APPEARANCE UR: CLEAR
AST SERPL-CCNC: 55 UNIT/L (ref 5–34)
BILIRUB UR QL STRIP.AUTO: NEGATIVE MG/DL
BILIRUBIN DIRECT+TOT PNL SERPL-MCNC: 0.9 MG/DL
BUN SERPL-MCNC: 11.5 MG/DL (ref 8.4–25.7)
CALCIUM SERPL-MCNC: 9.2 MG/DL (ref 8.8–10)
CHLORIDE SERPL-SCNC: 105 MMOL/L (ref 98–107)
CO2 SERPL-SCNC: 24 MMOL/L (ref 23–31)
COLOR UR AUTO: ABNORMAL
CREAT SERPL-MCNC: 0.71 MG/DL (ref 0.73–1.18)
EOSINOPHIL NFR BLD MANUAL: 0.08 X10(3)/MCL (ref 0–0.9)
EOSINOPHIL NFR BLD MANUAL: 2 % (ref 0–8)
ERYTHROCYTE [DISTWIDTH] IN BLOOD BY AUTOMATED COUNT: 15.1 % (ref 11.5–17)
GLOBULIN SER-MCNC: 3.6 GM/DL (ref 2.4–3.5)
GLUCOSE SERPL-MCNC: 92 MG/DL (ref 82–115)
GLUCOSE UR QL STRIP.AUTO: NEGATIVE MG/DL
HCT VFR BLD AUTO: 43.1 % (ref 42–52)
HGB BLD-MCNC: 14.7 GM/DL (ref 14–18)
KETONES UR QL STRIP.AUTO: NEGATIVE MG/DL
LEUKOCYTE ESTERASE UR QL STRIP.AUTO: NEGATIVE UNIT/L
LYMPHOCYTES NFR BLD MANUAL: 1.04 X10(3)/MCL
LYMPHOCYTES NFR BLD MANUAL: 26 % (ref 13–40)
MCH RBC QN AUTO: 27.5 PG (ref 27–31)
MCHC RBC AUTO-ENTMCNC: 34.1 MG/DL (ref 33–36)
MCV RBC AUTO: 80.6 FL (ref 80–94)
MONOCYTES NFR BLD MANUAL: 0.56 X10(3)/MCL (ref 0.1–1.3)
MONOCYTES NFR BLD MANUAL: 14 % (ref 2–11)
NEUTROPHILS NFR BLD MANUAL: 58 % (ref 47–80)
NITRITE UR QL STRIP.AUTO: NEGATIVE
NRBC BLD AUTO-RTO: 0 %
PH UR STRIP.AUTO: 5.5 [PH]
PLATELET # BLD AUTO: 98 X10(3)/MCL (ref 130–400)
PLATELET # BLD EST: ABNORMAL 10*3/UL
PLATELETS.RETICULATED NFR BLD AUTO: 5.9 % (ref 0.9–11.2)
PMV BLD AUTO: 10.5 FL (ref 9.4–12.4)
POTASSIUM SERPL-SCNC: 4.2 MMOL/L (ref 3.5–5.1)
PROT SERPL-MCNC: 7.1 GM/DL (ref 5.8–7.6)
PROT UR QL STRIP.AUTO: NEGATIVE MG/DL
RBC # BLD AUTO: 5.35 X10(6)/MCL (ref 4.7–6.1)
RBC MORPH BLD: NORMAL
RBC UR QL AUTO: NEGATIVE UNIT/L
SODIUM SERPL-SCNC: 139 MMOL/L (ref 136–145)
SP GR UR STRIP.AUTO: 1.02
TROPONIN I SERPL-MCNC: <0.01 NG/ML (ref 0–0.04)
UROBILINOGEN UR STRIP-ACNC: 1 MG/DL
WBC # SPEC AUTO: 4 X10(3)/MCL (ref 4.5–11.5)

## 2022-06-06 PROCEDURE — 80053 COMPREHEN METABOLIC PANEL: CPT | Performed by: PHYSICIAN ASSISTANT

## 2022-06-06 PROCEDURE — 93010 ELECTROCARDIOGRAM REPORT: CPT | Mod: ,,, | Performed by: INTERNAL MEDICINE

## 2022-06-06 PROCEDURE — 93010 EKG 12-LEAD: ICD-10-PCS | Mod: ,,, | Performed by: INTERNAL MEDICINE

## 2022-06-06 PROCEDURE — 84484 ASSAY OF TROPONIN QUANT: CPT | Performed by: PHYSICIAN ASSISTANT

## 2022-06-06 PROCEDURE — 99285 EMERGENCY DEPT VISIT HI MDM: CPT | Mod: 25

## 2022-06-06 PROCEDURE — 85025 COMPLETE CBC W/AUTO DIFF WBC: CPT | Performed by: PHYSICIAN ASSISTANT

## 2022-06-06 PROCEDURE — 93005 ELECTROCARDIOGRAM TRACING: CPT

## 2022-06-06 PROCEDURE — 36415 COLL VENOUS BLD VENIPUNCTURE: CPT | Performed by: PHYSICIAN ASSISTANT

## 2022-06-06 PROCEDURE — 81003 URINALYSIS AUTO W/O SCOPE: CPT | Performed by: PHYSICIAN ASSISTANT

## 2022-06-06 RX ORDER — FLUTICASONE PROPIONATE 50 MCG
1 SPRAY, SUSPENSION (ML) NASAL 2 TIMES DAILY PRN
Qty: 16 G | Refills: 0 | Status: SHIPPED | OUTPATIENT
Start: 2022-06-06

## 2022-06-06 RX ORDER — FINASTERIDE 5 MG/1
5 TABLET, FILM COATED ORAL NIGHTLY
COMMUNITY
Start: 2022-05-17

## 2022-06-06 RX ORDER — MECLIZINE HCL 12.5 MG 12.5 MG/1
12.5 TABLET ORAL 3 TIMES DAILY PRN
Qty: 20 TABLET | Refills: 0 | Status: SHIPPED | OUTPATIENT
Start: 2022-06-06

## 2022-06-06 RX ORDER — BRIMONIDINE TARTRATE, TIMOLOL MALEATE 2; 5 MG/ML; MG/ML
1 SOLUTION/ DROPS OPHTHALMIC 2 TIMES DAILY
COMMUNITY
Start: 2022-03-16

## 2022-06-06 RX ORDER — CETIRIZINE HYDROCHLORIDE 10 MG/1
10 TABLET ORAL DAILY
Qty: 30 TABLET | Refills: 0 | Status: SHIPPED | OUTPATIENT
Start: 2022-06-06 | End: 2022-07-06

## 2022-06-06 RX ORDER — ATORVASTATIN CALCIUM 40 MG/1
TABLET, FILM COATED ORAL
COMMUNITY
Start: 2022-03-29

## 2022-06-06 RX ORDER — LATANOPROSTENE BUNOD 0.24 MG/ML
1 SOLUTION/ DROPS OPHTHALMIC NIGHTLY
COMMUNITY
Start: 2022-01-07

## 2022-06-06 RX ORDER — AMOXICILLIN 500 MG/1
500 CAPSULE ORAL 3 TIMES DAILY
Qty: 21 CAPSULE | Refills: 0 | Status: SHIPPED | OUTPATIENT
Start: 2022-06-06 | End: 2022-06-13

## 2022-06-06 RX ORDER — METOPROLOL TARTRATE 25 MG/1
50 TABLET, FILM COATED ORAL 2 TIMES DAILY
COMMUNITY
Start: 2022-03-16

## 2022-06-06 RX ORDER — ASPIRIN 81 MG/1
81 TABLET ORAL
COMMUNITY
Start: 2021-11-08

## 2022-06-06 RX ORDER — CETIRIZINE HYDROCHLORIDE 10 MG/1
10 TABLET ORAL
COMMUNITY
Start: 2021-12-18 | End: 2022-06-06

## 2022-06-06 NOTE — ED TRIAGE NOTES
Pt complaint of ongoing ringing in the ear resulting in dizziness with past care at [facility] and [facility]

## 2022-06-06 NOTE — DISCHARGE INSTRUCTIONS
Drink plenty of fluids.  Take full course of antibiotics.  May use meclizine for dizziness.  Follow up with PCP in 7-10 days.

## 2022-06-06 NOTE — ED PROVIDER NOTES
Encounter Date: 6/6/2022       History     Chief Complaint   Patient presents with    Dizziness     Pt complaint of ongoing ringing in the ear resulting in dizziness with past care at [facility]     76-year-old male presents with intermittent dizziness and ringing in his ears since yesterday.  Patient reports that he has been having intermittent issues with dizziness over the last month.  States that he feels like his ears are clogged up.  Denies any fever cough or congestion.  Denies any weakness.  Denies any blurry vision.  Denies any chest pain or shortness of breath.    The history is provided by the patient.     Review of patient's allergies indicates:  No Known Allergies  Past Medical History:   Diagnosis Date    Past heart attack      Past Surgical History:   Procedure Laterality Date    HERNIA REPAIR      PROSTATE SURGERY       No family history on file.     Review of Systems   Constitutional: Negative for fever.   HENT: Positive for ear pain. Negative for congestion, ear discharge and sore throat.    Respiratory: Negative for cough and shortness of breath.    Cardiovascular: Negative for chest pain.   Gastrointestinal: Negative for constipation, diarrhea, nausea and vomiting.   Genitourinary: Negative for dysuria.   Musculoskeletal: Negative for back pain.   Skin: Negative for rash.   Neurological: Positive for dizziness. Negative for weakness.   Hematological: Does not bruise/bleed easily.   All other systems reviewed and are negative.      Physical Exam     Initial Vitals [06/06/22 1013]   BP Pulse Resp Temp SpO2   (!) 147/86 85 18 98.2 °F (36.8 °C) 97 %      MAP       --         Physical Exam    Nursing note and vitals reviewed.  Constitutional: He appears well-developed and well-nourished. He is cooperative.   HENT:   Head: Normocephalic and atraumatic.   Right Ear: Hearing normal. No drainage, swelling or tenderness. Tympanic membrane is perforated. Tympanic membrane is not erythematous. No  middle ear effusion.   Left Ear: No drainage, swelling or tenderness. Tympanic membrane is erythematous and bulging. Tympanic membrane is not perforated.   Eyes: Conjunctivae and EOM are normal. Pupils are equal, round, and reactive to light.   Neck: Neck supple.   Normal range of motion.  Cardiovascular: Normal rate, regular rhythm and normal heart sounds.   Pulmonary/Chest: Breath sounds normal. No respiratory distress. He has no wheezes. He has no rhonchi. He has no rales.   Abdominal: Abdomen is soft. Bowel sounds are normal. There is no abdominal tenderness.   Musculoskeletal:      Cervical back: Normal range of motion and neck supple.     Neurological: He is alert and oriented to person, place, and time.   Skin: Skin is warm and dry.   Psychiatric: He has a normal mood and affect.         ED Course   Procedures  Labs Reviewed   COMPREHENSIVE METABOLIC PANEL - Abnormal; Notable for the following components:       Result Value    Creatinine 0.71 (*)     Globulin 3.6 (*)     Albumin/Globulin Ratio 1.0 (*)     Aspartate Aminotransferase 55 (*)     All other components within normal limits   URINALYSIS, REFLEX TO URINE CULTURE - Abnormal; Notable for the following components:    Color, UA Straw (*)     All other components within normal limits   CBC WITH DIFFERENTIAL - Abnormal; Notable for the following components:    WBC 4.0 (*)     Platelet 98 (*)     All other components within normal limits   MANUAL DIFFERENTIAL - Abnormal; Notable for the following components:    Monocyte Man 14 (*)     Abs Lymp 1.04 (*)     Platelet Est Decreased (*)     All other components within normal limits   TROPONIN I - Normal   CBC W/ AUTO DIFFERENTIAL    Narrative:     The following orders were created for panel order CBC auto differential.  Procedure                               Abnormality         Status                     ---------                               -----------         ------                     CBC with  Differential[[id number]]        Abnormal            Final result               Manual Differential[[id number]]          Abnormal            Final result                 Please view results for these tests on the individual orders.     EKG Readings: (Independently Interpreted)   Initial Reading: No STEMI. Previous EKG: Compared with most recent EKG Rhythm: Sinus Bradycardia. Heart Rate: 54. Ectopy: No Ectopy. Conduction: Normal. ST Segments: Normal ST Segments. T Waves: Normal. Clinical Impression: Normal Sinus Rhythm and Sinus Bradycardia     ECG Results          EKG 12-lead (In process)  Result time 06/06/22 15:54:48    In process by Interface, Lab In [facility] (06/06/22 15:54:48)                 Narrative:    Test Reason : R42,    Vent. Rate : 054 BPM     Atrial Rate : 054 BPM     P-R Int : 156 ms          QRS Dur : 082 ms      QT Int : 444 ms       P-R-T Axes : 065 030 042 degrees     QTc Int : 421 ms    Sinus bradycardia  Septal infarct ,age undetermined  Abnormal ECG  No previous ECGs available    Referred By: [email or web address]   SELF           Confirmed By:                             Imaging Results          CT Head Without Contrast (Final result)  Result time 06/06/22 13:47:02    Final result by Juan Park MD (06/06/22 13:47:02)                 Impression:      1. No acute intracranial abnormality is visualized.      Electronically signed by: Juan Park MD  Date:    06/06/2022  Time:    13:47             Narrative:    EXAMINATION:  CT HEAD WITHOUT CONTRAST    CPT: 74381    CLINICAL HISTORY:  Dizziness, persistent/recurrent, cardiac or vascular cause suspected;.    TECHNIQUE:  Axial CT slices through the head were obtained without the administration of contrast.  Sagittal and coronal reconstructions were performed.  Automated exposure control was utilized.  Total DLP is approximately  mGy cm.    COMPARISON:  CT head dated 04/14/2015    FINDINGS:  Age-appropriate generalized involutional changes are seen.  No  evidence of acute intracranial hemorrhage, mass effect, midline deviation, hydrocephalus, or abnormal extra-axial fluid collection is visualized.  There appears to be mild periventricular and deep white matter hypoattenuation which is nonspecific, but is most commonly associated with chronic microvascular ischemic changes.  No evidence of acute large vessel territory ischemia/infarction is appreciated.  MRI with diffusion-weighted imaging is more sensitive in the assessment of acute ischemia/infarction.  The basilar cisterns are preserved. The visualized paranasal sinuses appear grossly aerated.  There is suggestion of partial opacification of a few lateral and inferior mastoid air cells which could reflect possible trace mastoid effusions.  No acute displaced calvarial fracture is visualized.                                 Medications - No data to display  Medical Decision Making:   ED Management:  76-year-old male presents with intermittent dizziness and ear pain over the last several days.  Labs unremarkable with negative troponin EKG normal.  CT head negative.  Patient with left otitis media will treat with antibiotics.  Discussed likely vertigo from infection.  Will give meclizine for dizziness.  Discussed follow-up with PCP for further evaluation.  Patient verbalizes understanding and agrees with plan of care.                        Clinical Impression:   Final diagnoses:  [R42] Dizziness (Primary)  [H66.92] Left otitis media, unspecified otitis media type          ED Disposition Condition    Discharge Stable        ED Prescriptions     Medication Sig Dispense Start Date End Date Auth. Provider    amoxicillin (AMOXIL) 500 MG capsule Take 1 capsule (500 mg total) by mouth 3 (three) times daily. for 7 days 21 capsule 6/6/2022 6/13/2022 DRE Vila    meclizine (ANTIVERT) 12.5 mg tablet Take 1 tablet (12.5 mg total) by mouth 3 (three) times daily as needed for Dizziness. 20 tablet 6/6/2022  DRE Vila     fluticasone propionate (FLONASE) 50 mcg/actuation nasal spray 1 spray (50 mcg total) by Each Nostril route 2 (two) times daily as needed for Rhinitis or Allergies. 16 g 6/6/2022  DRE Vila    cetirizine (ZYRTEC) 10 MG tablet Take 1 tablet (10 mg total) by mouth once daily. 30 tablet 6/6/2022 7/6/2022 DRE Vila        Follow-up Information     Follow up With Specialties Details Why Contact Info    Simeon Machuca MD Hospitalist In 1 week As needed [address] [facility]  [address]  [facility] [id number] [phone]             DRE Vila  06/06/22 [id number]

## 2024-01-11 ENCOUNTER — LAB VISIT (OUTPATIENT)
Dept: LAB | Facility: HOSPITAL | Age: 79
End: 2024-01-11
Attending: INTERNAL MEDICINE
Payer: COMMERCIAL

## 2024-01-11 DIAGNOSIS — E78.5 HYPERLIPIDEMIA, UNSPECIFIED HYPERLIPIDEMIA TYPE: ICD-10-CM

## 2024-01-11 DIAGNOSIS — I25.10 CORONARY ATHEROSCLEROSIS OF NATIVE CORONARY ARTERY: Primary | ICD-10-CM

## 2024-01-11 LAB
ALBUMIN SERPL-MCNC: 3.5 G/DL (ref 3.4–4.8)
ALBUMIN/GLOB SERPL: 1.1 RATIO (ref 1.1–2)
ALP SERPL-CCNC: 68 UNIT/L (ref 40–150)
ALT SERPL-CCNC: 33 UNIT/L (ref 0–55)
AST SERPL-CCNC: 41 UNIT/L (ref 5–34)
BILIRUB SERPL-MCNC: 0.7 MG/DL
BUN SERPL-MCNC: 12.2 MG/DL (ref 8.4–25.7)
CALCIUM SERPL-MCNC: 8.9 MG/DL (ref 8.8–10)
CHLORIDE SERPL-SCNC: 105 MMOL/L (ref 98–107)
CHOLEST SERPL-MCNC: 134 MG/DL
CHOLEST/HDLC SERPL: 2 {RATIO} (ref 0–5)
CO2 SERPL-SCNC: 29 MMOL/L (ref 23–31)
CREAT SERPL-MCNC: 0.78 MG/DL (ref 0.73–1.18)
GFR SERPLBLD CREATININE-BSD FMLA CKD-EPI: >60 MLS/MIN/1.73/M2
GLOBULIN SER-MCNC: 3.3 GM/DL (ref 2.4–3.5)
GLUCOSE SERPL-MCNC: 94 MG/DL (ref 82–115)
HDLC SERPL-MCNC: 58 MG/DL (ref 35–60)
LDLC SERPL CALC-MCNC: 66 MG/DL (ref 50–140)
POTASSIUM SERPL-SCNC: 4.6 MMOL/L (ref 3.5–5.1)
PROT SERPL-MCNC: 6.8 GM/DL (ref 5.8–7.6)
SODIUM SERPL-SCNC: 138 MMOL/L (ref 136–145)
TRIGL SERPL-MCNC: 51 MG/DL (ref 34–140)
VLDLC SERPL CALC-MCNC: 10 MG/DL

## 2024-01-11 PROCEDURE — 80061 LIPID PANEL: CPT

## 2024-01-11 PROCEDURE — 80053 COMPREHEN METABOLIC PANEL: CPT

## 2024-01-11 PROCEDURE — 36415 COLL VENOUS BLD VENIPUNCTURE: CPT

## 2024-02-26 ENCOUNTER — LAB VISIT (OUTPATIENT)
Dept: LAB | Facility: HOSPITAL | Age: 79
End: 2024-02-26
Attending: INTERNAL MEDICINE
Payer: COMMERCIAL

## 2024-02-26 DIAGNOSIS — I10 BENIGN HYPERTENSION: Primary | ICD-10-CM

## 2024-02-26 DIAGNOSIS — D64.9 ANEMIA, UNSPECIFIED TYPE: ICD-10-CM

## 2024-02-26 DIAGNOSIS — I25.10 CORONARY ARTERY DISEASE, UNSPECIFIED VESSEL OR LESION TYPE, UNSPECIFIED WHETHER ANGINA PRESENT, UNSPECIFIED WHETHER NATIVE OR TRANSPLANTED HEART: ICD-10-CM

## 2024-02-26 DIAGNOSIS — E78.5 HYPERLIPIDEMIA, UNSPECIFIED HYPERLIPIDEMIA TYPE: ICD-10-CM

## 2024-02-26 LAB
ANION GAP SERPL CALC-SCNC: 8 MEQ/L
BUN SERPL-MCNC: 15.9 MG/DL (ref 8.4–25.7)
CALCIUM SERPL-MCNC: 9.2 MG/DL (ref 8.8–10)
CHLORIDE SERPL-SCNC: 107 MMOL/L (ref 98–107)
CO2 SERPL-SCNC: 24 MMOL/L (ref 23–31)
CREAT SERPL-MCNC: 0.81 MG/DL (ref 0.73–1.18)
CREAT/UREA NIT SERPL: 20
ERYTHROCYTE [DISTWIDTH] IN BLOOD BY AUTOMATED COUNT: 14.7 % (ref 11.5–17)
GFR SERPLBLD CREATININE-BSD FMLA CKD-EPI: >60 MLS/MIN/1.73/M2
GLUCOSE SERPL-MCNC: 93 MG/DL (ref 82–115)
HCT VFR BLD AUTO: 42.4 % (ref 42–52)
HGB BLD-MCNC: 13.4 G/DL (ref 14–18)
MCH RBC QN AUTO: 28 PG (ref 27–31)
MCHC RBC AUTO-ENTMCNC: 31.6 G/DL (ref 33–36)
MCV RBC AUTO: 88.5 FL (ref 80–94)
NRBC BLD AUTO-RTO: 0 %
PLATELET # BLD AUTO: 143 X10(3)/MCL (ref 130–400)
PMV BLD AUTO: 11 FL (ref 7.4–10.4)
POTASSIUM SERPL-SCNC: 4.2 MMOL/L (ref 3.5–5.1)
RBC # BLD AUTO: 4.79 X10(6)/MCL (ref 4.7–6.1)
SODIUM SERPL-SCNC: 139 MMOL/L (ref 136–145)
WBC # SPEC AUTO: 4.86 X10(3)/MCL (ref 4.5–11.5)

## 2024-02-26 PROCEDURE — 85027 COMPLETE CBC AUTOMATED: CPT

## 2024-02-26 PROCEDURE — 36415 COLL VENOUS BLD VENIPUNCTURE: CPT

## 2024-02-26 PROCEDURE — 80048 BASIC METABOLIC PNL TOTAL CA: CPT

## 2024-04-10 ENCOUNTER — HOSPITAL ENCOUNTER (OUTPATIENT)
Facility: HOSPITAL | Age: 79
Discharge: HOME | End: 2024-04-10
Attending: INTERNAL MEDICINE | Admitting: INTERNAL MEDICINE
Payer: COMMERCIAL

## 2024-04-10 DIAGNOSIS — R07.9 CHEST PAIN: ICD-10-CM

## 2024-04-10 DIAGNOSIS — I70.213 ATHEROSCLEROSIS OF NATIVE ARTERY OF BOTH LOWER EXTREMITIES WITH INTERMITTENT CLAUDICATION: ICD-10-CM

## 2024-04-10 PROCEDURE — C9765 REVASC INTRA LITHOTRIP-STENT: HCPCS | Mod: LT | Performed by: INTERNAL MEDICINE

## 2024-04-10 PROCEDURE — C1874 STENT, COATED/COV W/DEL SYS: HCPCS | Performed by: INTERNAL MEDICINE

## 2024-04-10 PROCEDURE — C1887 CATHETER, GUIDING: HCPCS | Performed by: INTERNAL MEDICINE

## 2024-04-10 PROCEDURE — 99152 MOD SED SAME PHYS/QHP 5/>YRS: CPT | Performed by: INTERNAL MEDICINE

## 2024-04-10 PROCEDURE — 75630 X-RAY AORTA LEG ARTERIES: CPT | Mod: LT | Performed by: INTERNAL MEDICINE

## 2024-04-10 PROCEDURE — 25500020 PHARM REV CODE 255: Performed by: INTERNAL MEDICINE

## 2024-04-10 PROCEDURE — 63600175 PHARM REV CODE 636 W HCPCS: Performed by: INTERNAL MEDICINE

## 2024-04-10 PROCEDURE — C1894 INTRO/SHEATH, NON-LASER: HCPCS | Performed by: INTERNAL MEDICINE

## 2024-04-10 PROCEDURE — C1725 CATH, TRANSLUMIN NON-LASER: HCPCS | Performed by: INTERNAL MEDICINE

## 2024-04-10 PROCEDURE — 25000003 PHARM REV CODE 250: Performed by: INTERNAL MEDICINE

## 2024-04-10 PROCEDURE — C1769 GUIDE WIRE: HCPCS | Performed by: INTERNAL MEDICINE

## 2024-04-10 PROCEDURE — 27201423 OPTIME MED/SURG SUP & DEVICES STERILE SUPPLY: Performed by: INTERNAL MEDICINE

## 2024-04-10 PROCEDURE — 37252 INTRVASC US NONCORONARY 1ST: CPT | Performed by: INTERNAL MEDICINE

## 2024-04-10 PROCEDURE — 99153 MOD SED SAME PHYS/QHP EA: CPT | Performed by: INTERNAL MEDICINE

## 2024-04-10 PROCEDURE — C1760 CLOSURE DEV, VASC: HCPCS | Performed by: INTERNAL MEDICINE

## 2024-04-10 DEVICE — ANGIO-SEAL VIP VASCULAR CLOSURE DEVICE
Type: IMPLANTABLE DEVICE | Site: GROIN | Status: FUNCTIONAL
Brand: ANGIO-SEAL

## 2024-04-10 DEVICE — VIABAHN BX BALLOON EXP ENDO 8MMX29MM 7FR 135CMCATH HEPARIN
Type: IMPLANTABLE DEVICE | Site: ARTERIAL | Status: FUNCTIONAL
Brand: GORE VIABAHN VBX BALLOON EXPANDABLE ENDO

## 2024-04-10 RX ORDER — DIPHENHYDRAMINE HYDROCHLORIDE 50 MG/ML
INJECTION INTRAMUSCULAR; INTRAVENOUS
Status: DISCONTINUED | OUTPATIENT
Start: 2024-04-10 | End: 2024-04-10 | Stop reason: HOSPADM

## 2024-04-10 RX ORDER — LIDOCAINE HYDROCHLORIDE 10 MG/ML
INJECTION, SOLUTION EPIDURAL; INFILTRATION; INTRACAUDAL; PERINEURAL
Status: DISCONTINUED | OUTPATIENT
Start: 2024-04-10 | End: 2024-04-10 | Stop reason: HOSPADM

## 2024-04-10 RX ORDER — DIPHENHYDRAMINE HCL 25 MG
50 CAPSULE ORAL
Status: DISCONTINUED | OUTPATIENT
Start: 2024-04-10 | End: 2024-04-10 | Stop reason: HOSPADM

## 2024-04-10 RX ORDER — HYDRALAZINE HYDROCHLORIDE 20 MG/ML
10 INJECTION INTRAMUSCULAR; INTRAVENOUS EVERY 4 HOURS PRN
Status: DISCONTINUED | OUTPATIENT
Start: 2024-04-10 | End: 2024-04-10 | Stop reason: HOSPADM

## 2024-04-10 RX ORDER — SODIUM CHLORIDE 0.9 % (FLUSH) 0.9 %
10 SYRINGE (ML) INJECTION
Status: DISCONTINUED | OUTPATIENT
Start: 2024-04-10 | End: 2024-04-10 | Stop reason: HOSPADM

## 2024-04-10 RX ORDER — FENTANYL CITRATE 50 UG/ML
INJECTION, SOLUTION INTRAMUSCULAR; INTRAVENOUS
Status: DISCONTINUED | OUTPATIENT
Start: 2024-04-10 | End: 2024-04-10 | Stop reason: HOSPADM

## 2024-04-10 RX ORDER — SODIUM CHLORIDE 9 MG/ML
INJECTION, SOLUTION INTRAVENOUS ONCE
Status: DISCONTINUED | OUTPATIENT
Start: 2024-04-10 | End: 2024-04-10 | Stop reason: HOSPADM

## 2024-04-10 RX ORDER — MORPHINE SULFATE 4 MG/ML
2 INJECTION, SOLUTION INTRAMUSCULAR; INTRAVENOUS EVERY 4 HOURS PRN
Status: DISCONTINUED | OUTPATIENT
Start: 2024-04-10 | End: 2024-04-10 | Stop reason: HOSPADM

## 2024-04-10 RX ORDER — CLOPIDOGREL BISULFATE 75 MG/1
75 TABLET ORAL DAILY
Qty: 90 TABLET | Refills: 3 | Status: SHIPPED | OUTPATIENT
Start: 2024-04-10 | End: 2025-04-10

## 2024-04-10 RX ORDER — ALUMINUM HYDROXIDE, MAGNESIUM HYDROXIDE, AND SIMETHICONE 1200; 120; 1200 MG/30ML; MG/30ML; MG/30ML
SUSPENSION ORAL
Status: DISCONTINUED | OUTPATIENT
Start: 2024-04-10 | End: 2024-04-10 | Stop reason: HOSPADM

## 2024-04-10 RX ORDER — MIDAZOLAM HYDROCHLORIDE 1 MG/ML
INJECTION, SOLUTION INTRAMUSCULAR; INTRAVENOUS
Status: DISCONTINUED | OUTPATIENT
Start: 2024-04-10 | End: 2024-04-10 | Stop reason: HOSPADM

## 2024-04-10 RX ORDER — DIAZEPAM 5 MG/1
10 TABLET ORAL
Status: DISCONTINUED | OUTPATIENT
Start: 2024-04-10 | End: 2024-04-10 | Stop reason: HOSPADM

## 2024-04-10 RX ORDER — HEPARIN SODIUM 1000 [USP'U]/ML
INJECTION, SOLUTION INTRAVENOUS; SUBCUTANEOUS
Status: DISCONTINUED | OUTPATIENT
Start: 2024-04-10 | End: 2024-04-10 | Stop reason: HOSPADM

## 2024-04-10 RX ORDER — ONDANSETRON 4 MG/1
8 TABLET, ORALLY DISINTEGRATING ORAL EVERY 8 HOURS PRN
Status: DISCONTINUED | OUTPATIENT
Start: 2024-04-10 | End: 2024-04-10 | Stop reason: HOSPADM

## 2024-04-10 RX ORDER — CLOPIDOGREL BISULFATE 300 MG/1
TABLET, FILM COATED ORAL
Status: DISCONTINUED | OUTPATIENT
Start: 2024-04-10 | End: 2024-04-10 | Stop reason: HOSPADM

## 2024-04-10 RX ORDER — HYDROCODONE BITARTRATE AND ACETAMINOPHEN 5; 325 MG/1; MG/1
1 TABLET ORAL EVERY 4 HOURS PRN
Status: DISCONTINUED | OUTPATIENT
Start: 2024-04-10 | End: 2024-04-10 | Stop reason: HOSPADM

## 2024-04-10 RX ORDER — ACETAMINOPHEN 325 MG/1
650 TABLET ORAL EVERY 4 HOURS PRN
Status: DISCONTINUED | OUTPATIENT
Start: 2024-04-10 | End: 2024-04-10 | Stop reason: HOSPADM

## 2024-04-10 RX ORDER — SODIUM CHLORIDE 9 MG/ML
INJECTION, SOLUTION INTRAVENOUS CONTINUOUS
Status: DISCONTINUED | OUTPATIENT
Start: 2024-04-10 | End: 2024-04-10 | Stop reason: HOSPADM

## 2024-04-10 RX ORDER — ACETAZOLAMIDE 500 MG/1
500 CAPSULE, EXTENDED RELEASE ORAL 2 TIMES DAILY
COMMUNITY

## 2024-04-10 RX ADMIN — DIPHENHYDRAMINE HYDROCHLORIDE 50 MG: 25 CAPSULE ORAL at 07:04

## 2024-04-10 RX ADMIN — DIAZEPAM 10 MG: 5 TABLET ORAL at 07:04

## 2024-04-10 NOTE — Clinical Note
An angiography was performed of the distal infrarenal abdominal aorta post intervention  via power injection.

## 2024-04-10 NOTE — DISCHARGE SUMMARY
[facility] - Cath Lab Services  Discharge Note  Short Stay    Procedure(s) (LRB):  Peripheral angiography (N/A)      OUTCOME: Condition has improved and patient is now ready for discharge.    DISPOSITION: Home or Self Care    FINAL DIAGNOSIS:  PAD    FOLLOWUP: In clinic    DISCHARGE INSTRUCTIONS:  No discharge procedures on file.     TIME SPENT ON DISCHARGE: 60 minutes

## 2024-04-10 NOTE — Clinical Note
A percutaneous stick to the left femoral and right femoral artery was performed. Ultrasound guidance was used to obtain access.

## 2024-04-12 VITALS
HEIGHT: 73 IN | TEMPERATURE: 98 F | SYSTOLIC BLOOD PRESSURE: 160 MMHG | DIASTOLIC BLOOD PRESSURE: 89 MMHG | OXYGEN SATURATION: 100 % | HEART RATE: 50 BPM | BODY MASS INDEX: 22 KG/M2 | WEIGHT: 166 LBS | RESPIRATION RATE: 15 BRPM

## 2024-08-13 ENCOUNTER — HOSPITAL ENCOUNTER (EMERGENCY)
Facility: HOSPITAL | Age: 79
Discharge: HOME | End: 2024-08-13
Attending: EMERGENCY MEDICINE
Payer: COMMERCIAL

## 2024-08-13 VITALS
RESPIRATION RATE: 18 BRPM | HEART RATE: 64 BPM | SYSTOLIC BLOOD PRESSURE: 109 MMHG | DIASTOLIC BLOOD PRESSURE: 76 MMHG | OXYGEN SATURATION: 98 % | BODY MASS INDEX: 21.87 KG/M2 | WEIGHT: 165 LBS | HEIGHT: 73 IN | TEMPERATURE: 98 F

## 2024-08-13 DIAGNOSIS — N64.4 MASTALGIA: Primary | ICD-10-CM

## 2024-08-13 PROCEDURE — 99284 EMERGENCY DEPT VISIT MOD MDM: CPT

## 2024-08-13 RX ORDER — HYDROCODONE BITARTRATE AND ACETAMINOPHEN 5; 325 MG/1; MG/1
1 TABLET ORAL EVERY 6 HOURS PRN
Qty: 20 TABLET | Refills: 0 | Status: SHIPPED | OUTPATIENT
Start: 2024-08-13 | End: 2024-08-18

## 2024-08-13 RX ORDER — TRAMADOL HYDROCHLORIDE 50 MG/1
50 TABLET ORAL EVERY 6 HOURS PRN
Qty: 20 TABLET | Refills: 0 | Status: SHIPPED | OUTPATIENT
Start: 2024-08-13 | End: 2024-08-13 | Stop reason: ALTCHOICE

## 2024-08-13 RX ORDER — SULFAMETHOXAZOLE AND TRIMETHOPRIM 800; 160 MG/1; MG/1
1 TABLET ORAL 2 TIMES DAILY
Qty: 20 TABLET | Refills: 0 | Status: ON HOLD | OUTPATIENT
Start: 2024-08-13 | End: 2024-08-23

## 2024-08-13 RX ORDER — ISOSORBIDE DINITRATE 5 MG/1
5 TABLET ORAL 2 TIMES DAILY
Status: ON HOLD | COMMUNITY
Start: 2024-07-17

## 2024-08-13 RX ORDER — SILDENAFIL 100 MG/1
100 TABLET, FILM COATED ORAL DAILY PRN
Status: ON HOLD | COMMUNITY
Start: 2024-07-03

## 2024-08-13 RX ORDER — RANOLAZINE 500 MG/1
500 TABLET, EXTENDED RELEASE ORAL 2 TIMES DAILY
Status: ON HOLD | COMMUNITY
Start: 2024-07-27

## 2024-08-13 NOTE — ED PROVIDER NOTES
Encounter Date: 8/13/2024       History     Chief Complaint   Patient presents with    Chest Mass     Noticed swelling to left pec 1 month ago; swelling worse over past few days     The history is provided by the patient.   General Illness   The current episode started several days ago. The problem occurs continuously. Nothing relieves the symptoms. Nothing aggravates the symptoms. Pertinent negatives include no fever, no nausea, no sore throat, no shortness of breath and no rash.   Patient notes painful/tender area to left breast x 1 month, getting worse.  No trauma.    Review of patient's allergies indicates:  No Known Allergies  Past Medical History:   Diagnosis Date    HLD (hyperlipidemia)     HTN (hypertension)     PAD (peripheral artery disease)     Past heart attack      Past Surgical History:   Procedure Laterality Date    CORONARY ANGIOPLASTY WITH STENT PLACEMENT      HERNIA REPAIR      PERIPHERAL ANGIOGRAPHY N/A 4/10/2024    Procedure: Peripheral angiography;  Surgeon: Alex Crawford MD;  Location: [facility] CATH LAB;  Service: Cardiology;  Laterality: N/A;  BIRGIT ANGIO *WILL NEED GORE & SHOCKWAVE*    PROSTATE SURGERY       No family history on file.  Social History     Tobacco Use    Smoking status: Former     Types: Cigarettes    Smokeless tobacco: Never   Substance Use Topics    Alcohol use: Yes     Comment: daily     Review of Systems   Constitutional:  Negative for fever.   HENT:  Negative for sore throat.    Respiratory:  Negative for shortness of breath.    Cardiovascular:  Negative for chest pain.   Gastrointestinal:  Negative for nausea.   Genitourinary:  Negative for dysuria.   Musculoskeletal:  Negative for back pain.   Skin:  Negative for rash.   Neurological:  Negative for weakness.   Hematological:  Does not bruise/bleed easily.       Physical Exam     Initial Vitals [08/13/24 1102]   BP Pulse Resp Temp SpO2   109/76 64 18 97.5 °F (36.4 °C) 98 %      MAP       --         Physical Exam    Nursing  note and vitals reviewed.  Constitutional: He appears well-developed and well-nourished.   HENT:   Head: Normocephalic and atraumatic.   Right Ear: External ear normal.   Left Ear: External ear normal.   Nose: Nose normal.   Eyes: Conjunctivae and EOM are normal. Pupils are equal, round, and reactive to light.   Neck: Neck supple.   Normal range of motion.  Cardiovascular:  Normal rate, regular rhythm, normal heart sounds and intact distal pulses.               Soft, mobile, tender mass deep to the areola.  Nipple is slightly retracted bilaterally.  No erythema or induration noted.  Healed surgical scar noted across the left anterior chest.   Pulmonary/Chest: Breath sounds normal.   Abdominal: Abdomen is soft. Bowel sounds are normal.   Musculoskeletal:         General: Normal range of motion.      Cervical back: Normal range of motion and neck supple.     Neurological: He is alert and oriented to person, place, and time. He has normal strength. GCS score is 15. GCS eye subscore is 4. GCS verbal subscore is 5. GCS motor subscore is 6.   Skin: Skin is warm and dry. Capillary refill takes less than 2 seconds.   Psychiatric: He has a normal mood and affect. His behavior is normal. Judgment and thought content normal.         ED Course   Procedures  Labs Reviewed - No data to display       Imaging Results    None          Medications - No data to display  Medical Decision Making  Risk  Prescription drug management.    Differential includes:  infection, neoplasm, fibrocystic disease.  Given acuity and pain/tenderness, infection is likely.  I do not think he has a drainable abscess.  Will D/C on antibiotics and analgesics and have him F/U with his PCP if symptoms don't improve.  If he does not respond to treatment, he will need referral to a breast center.                                  Clinical Impression:  Final diagnoses:  [N64.4] Mastalgia (Primary)          ED Disposition Condition    Discharge Stable          ED  Prescriptions       Medication Sig Dispense Start Date End Date Auth. Provider    traMADoL (ULTRAM) 50 mg tablet Take 1 tablet (50 mg total) by mouth every 6 (six) hours as needed (pain). 20 tablet 8/13/2024 8/18/2024 Shiva Lantigua MD    sulfamethoxazole-trimethoprim 800-160mg (BACTRIM DS) 800-160 mg Tab Take 1 tablet by mouth 2 (two) times daily. for 10 days 20 tablet 8/13/2024 8/23/2024 Shiva Lantigua MD          Follow-up Information       Follow up With Specialties Details Why Contact Info    Follow up with your primary MD in 3-5 days if not improved.  Return to ED for worsening symptoms.                 Shiva Lantigua MD  08/13/24 0002

## 2024-08-23 ENCOUNTER — HOSPITAL ENCOUNTER (INPATIENT)
Facility: HOSPITAL | Age: 79
LOS: 2 days | Discharge: HOME | DRG: 195 | End: 2024-08-25
Attending: EMERGENCY MEDICINE | Admitting: INTERNAL MEDICINE
Payer: COMMERCIAL

## 2024-08-23 DIAGNOSIS — R53.1 WEAKNESS: ICD-10-CM

## 2024-08-23 DIAGNOSIS — R07.9 CHEST PAIN: ICD-10-CM

## 2024-08-23 DIAGNOSIS — J18.9 PNEUMONIA OF LEFT LOWER LOBE DUE TO INFECTIOUS ORGANISM: Primary | ICD-10-CM

## 2024-08-23 LAB
ALBUMIN SERPL-MCNC: 3.1 G/DL (ref 3.4–4.8)
ALBUMIN/GLOB SERPL: 0.8 RATIO (ref 1.1–2)
ALP SERPL-CCNC: 62 UNIT/L (ref 40–150)
ALT SERPL-CCNC: 20 UNIT/L (ref 0–55)
ANION GAP SERPL CALC-SCNC: 13 MEQ/L
AST SERPL-CCNC: 38 UNIT/L (ref 5–34)
BACTERIA #/AREA URNS AUTO: ABNORMAL /HPF
BASOPHILS # BLD AUTO: 0.01 X10(3)/MCL
BASOPHILS NFR BLD AUTO: 0.1 %
BILIRUB SERPL-MCNC: 0.5 MG/DL
BILIRUB UR QL STRIP.AUTO: ABNORMAL
BUN SERPL-MCNC: 11.7 MG/DL (ref 8.4–25.7)
CALCIUM SERPL-MCNC: 8.5 MG/DL (ref 8.8–10)
CHLORIDE SERPL-SCNC: 101 MMOL/L (ref 98–107)
CLARITY UR: CLEAR
CO2 SERPL-SCNC: 15 MMOL/L (ref 23–31)
COLOR UR AUTO: YELLOW
CREAT SERPL-MCNC: 1.11 MG/DL (ref 0.73–1.18)
CREAT/UREA NIT SERPL: 11
EOSINOPHIL # BLD AUTO: 0.02 X10(3)/MCL (ref 0–0.9)
EOSINOPHIL NFR BLD AUTO: 0.3 %
ERYTHROCYTE [DISTWIDTH] IN BLOOD BY AUTOMATED COUNT: 15.6 % (ref 11.5–17)
FLUAV AG UPPER RESP QL IA.RAPID: NOT DETECTED
FLUBV AG UPPER RESP QL IA.RAPID: NOT DETECTED
GFR SERPLBLD CREATININE-BSD FMLA CKD-EPI: >60 ML/MIN/1.73/M2
GLOBULIN SER-MCNC: 3.7 GM/DL (ref 2.4–3.5)
GLUCOSE SERPL-MCNC: 139 MG/DL (ref 82–115)
GLUCOSE UR QL STRIP: NEGATIVE
HCT VFR BLD AUTO: 41.3 % (ref 42–52)
HGB BLD-MCNC: 14.3 G/DL (ref 14–18)
HGB UR QL STRIP: ABNORMAL
IMM GRANULOCYTES # BLD AUTO: 0.03 X10(3)/MCL (ref 0–0.04)
IMM GRANULOCYTES NFR BLD AUTO: 0.4 %
KETONES UR QL STRIP: NEGATIVE
LACTATE SERPL-SCNC: 1.5 MMOL/L (ref 0.5–2.2)
LEUKOCYTE ESTERASE UR QL STRIP: NEGATIVE
LYMPHOCYTES # BLD AUTO: 0.58 X10(3)/MCL (ref 0.6–4.6)
LYMPHOCYTES NFR BLD AUTO: 8.4 %
MCH RBC QN AUTO: 27.7 PG (ref 27–31)
MCHC RBC AUTO-ENTMCNC: 34.6 G/DL (ref 33–36)
MCV RBC AUTO: 80 FL (ref 80–94)
MONOCYTES # BLD AUTO: 0.87 X10(3)/MCL (ref 0.1–1.3)
MONOCYTES NFR BLD AUTO: 12.6 %
NEUTROPHILS # BLD AUTO: 5.39 X10(3)/MCL (ref 2.1–9.2)
NEUTROPHILS NFR BLD AUTO: 78.2 %
NITRITE UR QL STRIP: NEGATIVE
NRBC BLD AUTO-RTO: 0 %
PH UR STRIP: 6 [PH]
PLATELET # BLD AUTO: 97 X10(3)/MCL (ref 130–400)
PLATELETS.RETICULATED NFR BLD AUTO: 8.8 % (ref 0.9–11.2)
PMV BLD AUTO: 10.3 FL (ref 7.4–10.4)
POTASSIUM SERPL-SCNC: 3.9 MMOL/L (ref 3.5–5.1)
PROT SERPL-MCNC: 6.8 GM/DL (ref 5.8–7.6)
PROT UR QL STRIP: NEGATIVE
RBC # BLD AUTO: 5.16 X10(6)/MCL (ref 4.7–6.1)
RBC #/AREA URNS AUTO: ABNORMAL /HPF
SARS-COV-2 RNA RESP QL NAA+PROBE: NOT DETECTED
SODIUM SERPL-SCNC: 129 MMOL/L (ref 136–145)
SP GR UR STRIP.AUTO: 1.02 (ref 1–1.03)
SQUAMOUS #/AREA URNS AUTO: ABNORMAL /HPF
STREP A PCR (OHS): NOT DETECTED
TROPONIN I SERPL-MCNC: 0.03 NG/ML (ref 0–0.04)
UROBILINOGEN UR STRIP-ACNC: 0.2
WBC # BLD AUTO: 6.9 X10(3)/MCL (ref 4.5–11.5)
WBC #/AREA URNS AUTO: ABNORMAL /HPF

## 2024-08-23 PROCEDURE — 96361 HYDRATE IV INFUSION ADD-ON: CPT

## 2024-08-23 PROCEDURE — 63600175 PHARM REV CODE 636 W HCPCS: Performed by: EMERGENCY MEDICINE

## 2024-08-23 PROCEDURE — 84484 ASSAY OF TROPONIN QUANT: CPT | Performed by: EMERGENCY MEDICINE

## 2024-08-23 PROCEDURE — 81001 URINALYSIS AUTO W/SCOPE: CPT | Performed by: EMERGENCY MEDICINE

## 2024-08-23 PROCEDURE — 25000003 PHARM REV CODE 250: Performed by: EMERGENCY MEDICINE

## 2024-08-23 PROCEDURE — 85025 COMPLETE CBC W/AUTO DIFF WBC: CPT | Performed by: EMERGENCY MEDICINE

## 2024-08-23 PROCEDURE — 93005 ELECTROCARDIOGRAM TRACING: CPT

## 2024-08-23 PROCEDURE — 0240U COVID/FLU A&B PCR: CPT | Performed by: EMERGENCY MEDICINE

## 2024-08-23 PROCEDURE — 81003 URINALYSIS AUTO W/O SCOPE: CPT | Performed by: EMERGENCY MEDICINE

## 2024-08-23 PROCEDURE — 83605 ASSAY OF LACTIC ACID: CPT | Performed by: EMERGENCY MEDICINE

## 2024-08-23 PROCEDURE — 93010 ELECTROCARDIOGRAM REPORT: CPT | Mod: ,,, | Performed by: INTERNAL MEDICINE

## 2024-08-23 PROCEDURE — 11000001 HC ACUTE MED/SURG PRIVATE ROOM

## 2024-08-23 PROCEDURE — 87651 STREP A DNA AMP PROBE: CPT | Performed by: EMERGENCY MEDICINE

## 2024-08-23 PROCEDURE — 87040 BLOOD CULTURE FOR BACTERIA: CPT | Performed by: EMERGENCY MEDICINE

## 2024-08-23 PROCEDURE — 96365 THER/PROPH/DIAG IV INF INIT: CPT

## 2024-08-23 PROCEDURE — 99285 EMERGENCY DEPT VISIT HI MDM: CPT | Mod: 25

## 2024-08-23 PROCEDURE — 80053 COMPREHEN METABOLIC PANEL: CPT | Performed by: EMERGENCY MEDICINE

## 2024-08-23 RX ORDER — GLUCAGON 1 MG
1 KIT INJECTION
Status: DISCONTINUED | OUTPATIENT
Start: 2024-08-24 | End: 2024-08-25 | Stop reason: HOSPADM

## 2024-08-23 RX ORDER — RANOLAZINE 500 MG/1
500 TABLET, EXTENDED RELEASE ORAL 2 TIMES DAILY
Status: DISCONTINUED | OUTPATIENT
Start: 2024-08-24 | End: 2024-08-25 | Stop reason: HOSPADM

## 2024-08-23 RX ORDER — CLOPIDOGREL BISULFATE 75 MG/1
75 TABLET ORAL DAILY
Status: DISCONTINUED | OUTPATIENT
Start: 2024-08-24 | End: 2024-08-25 | Stop reason: HOSPADM

## 2024-08-23 RX ORDER — ACETAMINOPHEN 500 MG
1000 TABLET ORAL
Status: COMPLETED | OUTPATIENT
Start: 2024-08-23 | End: 2024-08-23

## 2024-08-23 RX ORDER — FINASTERIDE 5 MG/1
5 TABLET, FILM COATED ORAL DAILY
Status: DISCONTINUED | OUTPATIENT
Start: 2024-08-24 | End: 2024-08-25 | Stop reason: HOSPADM

## 2024-08-23 RX ORDER — ONDANSETRON 4 MG/1
8 TABLET, ORALLY DISINTEGRATING ORAL EVERY 8 HOURS PRN
Status: DISCONTINUED | OUTPATIENT
Start: 2024-08-24 | End: 2024-08-25 | Stop reason: HOSPADM

## 2024-08-23 RX ORDER — IBUPROFEN 200 MG
24 TABLET ORAL
Status: DISCONTINUED | OUTPATIENT
Start: 2024-08-24 | End: 2024-08-25 | Stop reason: HOSPADM

## 2024-08-23 RX ORDER — ATORVASTATIN CALCIUM 40 MG/1
40 TABLET, FILM COATED ORAL EVERY EVENING
Status: DISCONTINUED | OUTPATIENT
Start: 2024-08-24 | End: 2024-08-25 | Stop reason: HOSPADM

## 2024-08-23 RX ORDER — ISOSORBIDE DINITRATE 5 MG/1
5 TABLET ORAL 2 TIMES DAILY
Status: DISCONTINUED | OUTPATIENT
Start: 2024-08-24 | End: 2024-08-25 | Stop reason: HOSPADM

## 2024-08-23 RX ORDER — ACETAMINOPHEN 325 MG/1
650 TABLET ORAL EVERY 4 HOURS PRN
Status: DISCONTINUED | OUTPATIENT
Start: 2024-08-24 | End: 2024-08-25 | Stop reason: HOSPADM

## 2024-08-23 RX ORDER — NALOXONE HCL 0.4 MG/ML
0.02 VIAL (ML) INJECTION
Status: DISCONTINUED | OUTPATIENT
Start: 2024-08-24 | End: 2024-08-25 | Stop reason: HOSPADM

## 2024-08-23 RX ORDER — AZITHROMYCIN 250 MG/1
500 TABLET, FILM COATED ORAL DAILY
Status: DISCONTINUED | OUTPATIENT
Start: 2024-08-24 | End: 2024-08-25 | Stop reason: HOSPADM

## 2024-08-23 RX ORDER — METOPROLOL TARTRATE 25 MG/1
25 TABLET, FILM COATED ORAL 2 TIMES DAILY
Status: DISCONTINUED | OUTPATIENT
Start: 2024-08-24 | End: 2024-08-25 | Stop reason: HOSPADM

## 2024-08-23 RX ORDER — ENOXAPARIN SODIUM 100 MG/ML
40 INJECTION SUBCUTANEOUS EVERY 24 HOURS
Status: DISCONTINUED | OUTPATIENT
Start: 2024-08-24 | End: 2024-08-25 | Stop reason: HOSPADM

## 2024-08-23 RX ORDER — MECLIZINE HCL 12.5 MG 12.5 MG/1
12.5 TABLET ORAL 3 TIMES DAILY PRN
Status: DISCONTINUED | OUTPATIENT
Start: 2024-08-24 | End: 2024-08-25 | Stop reason: HOSPADM

## 2024-08-23 RX ORDER — SODIUM CHLORIDE 0.9 % (FLUSH) 0.9 %
10 SYRINGE (ML) INJECTION EVERY 12 HOURS PRN
Status: DISCONTINUED | OUTPATIENT
Start: 2024-08-24 | End: 2024-08-25 | Stop reason: HOSPADM

## 2024-08-23 RX ORDER — FLUTICASONE PROPIONATE 50 MCG
1 SPRAY, SUSPENSION (ML) NASAL DAILY
Status: DISCONTINUED | OUTPATIENT
Start: 2024-08-24 | End: 2024-08-25 | Stop reason: HOSPADM

## 2024-08-23 RX ORDER — IBUPROFEN 200 MG
16 TABLET ORAL
Status: DISCONTINUED | OUTPATIENT
Start: 2024-08-24 | End: 2024-08-25 | Stop reason: HOSPADM

## 2024-08-23 RX ORDER — ASPIRIN 81 MG/1
81 TABLET ORAL DAILY
Status: DISCONTINUED | OUTPATIENT
Start: 2024-08-24 | End: 2024-08-25 | Stop reason: HOSPADM

## 2024-08-23 RX ADMIN — CEFTRIAXONE SODIUM 1 G: 1 INJECTION, POWDER, FOR SOLUTION INTRAMUSCULAR; INTRAVENOUS at 07:08

## 2024-08-23 RX ADMIN — SODIUM CHLORIDE, POTASSIUM CHLORIDE, SODIUM LACTATE AND CALCIUM CHLORIDE 1000 ML: 600; 310; 30; 20 INJECTION, SOLUTION INTRAVENOUS at 06:08

## 2024-08-23 RX ADMIN — AZITHROMYCIN MONOHYDRATE 500 MG: 500 INJECTION, POWDER, LYOPHILIZED, FOR SOLUTION INTRAVENOUS at 08:08

## 2024-08-23 RX ADMIN — ACETAMINOPHEN 1000 MG: 500 TABLET ORAL at 06:08

## 2024-08-23 NOTE — Clinical Note
Diagnosis: Weakness [[id number]]   Future Attending Provider: GELA KELLEY [[id number]]   Admit to which facility:: [facility] [[id number]]   Reason for IP Medical Treatment  (Clinical interventions that can only be accomplished in the IP setting? ) :: pneumonia   Plans for Post-Acute care--if anticipated (pick the single best option):: A. No post acute care anticipated at this time

## 2024-08-23 NOTE — ED PROVIDER NOTES
Encounter Date: 8/23/2024       History     Chief Complaint   Patient presents with    Fatigue     c/o increased fatigue/ weakness over the past month. Hx cardiac stents and emphysema     79-year-old male presents to the emergency room stating that for the last month his legs have been weak and today he was very weak so his daughter and granddaughter decided to call the.  On arrival who was noted to have a temp of 101.5°.  He denies ear pain, sore throat, runny nose, cough, chest pain, shortness of breath, abdominal pain, dysuria.  He was not aware that he had fever. No family present right now.    The history is provided by the patient.     Review of patient's allergies indicates:  No Known Allergies  Past Medical History:   Diagnosis Date    HLD (hyperlipidemia)     HTN (hypertension)     PAD (peripheral artery disease)     Past heart attack      Past Surgical History:   Procedure Laterality Date    CORONARY ANGIOPLASTY WITH STENT PLACEMENT      HERNIA REPAIR      PERIPHERAL ANGIOGRAPHY N/A 4/10/2024    Procedure: Peripheral angiography;  Surgeon: Alex Crawford MD;  Location: [facility] CATH LAB;  Service: Cardiology;  Laterality: N/A;  BIRGIT ANGIO *WILL NEED GORE & SHOCKWAVE*    PROSTATE SURGERY       No family history on file.  Social History     Tobacco Use    Smoking status: Former     Types: Cigarettes    Smokeless tobacco: Never   Substance Use Topics    Alcohol use: Yes     Comment: daily     Review of Systems   Constitutional:  Positive for fever.   Neurological:  Positive for weakness.   All other systems reviewed and are negative.      Physical Exam     Initial Vitals [08/23/24 1738]   BP Pulse Resp Temp SpO2   128/80 87 18 (!) 101.5 °F (38.6 °C) 95 %      MAP       --         Physical Exam    Nursing note and vitals reviewed.  Constitutional: Vital signs are normal. He appears well-developed and well-nourished.   HENT:   Head: Normocephalic and atraumatic.   Mouth/Throat: Oropharynx is clear and moist.   Eyes:  Pupils are equal, round, and reactive to light.   Neck: Neck supple. No JVD present.   Cardiovascular:  Normal rate, regular rhythm and normal heart sounds.           Pulmonary/Chest: Breath sounds normal. No respiratory distress.   Abdominal: Abdomen is soft. There is no abdominal tenderness.   Musculoskeletal:         General: No edema.      Cervical back: Neck supple. No edema or erythema.     Lymphadenopathy:     He has no cervical adenopathy.   Neurological: He is alert and oriented to person, place, and time. No cranial nerve deficit. GCS score is 15. GCS eye subscore is 4. GCS verbal subscore is 5. GCS motor subscore is 6.   Skin: Skin is warm and dry. Capillary refill takes less than 2 seconds.         ED Course   Procedures  Labs Reviewed   CBC WITH DIFFERENTIAL - Abnormal       Result Value    WBC 6.90      RBC 5.16      Hgb 14.3      Hct 41.3 (*)     MCV 80.0      MCH 27.7      MCHC 34.6      RDW 15.6      Platelet 97 (*)     MPV 10.3      IPF 8.8      Neut % 78.2      Lymph % 8.4      Mono % 12.6      Eos % 0.3      Basophil % 0.1      Lymph # 0.58 (*)     Neut # 5.39      Mono # 0.87      Eos # 0.02      Baso # 0.01      IG# 0.03      IG% 0.4      NRBC% 0.0     COMPREHENSIVE METABOLIC PANEL - Abnormal    Sodium 129 (*)     Potassium 3.9      Chloride 101      CO2 15 (*)     Glucose 139 (*)     Blood Urea Nitrogen 11.7      Creatinine 1.11      Calcium 8.5 (*)     Protein Total 6.8      Albumin 3.1 (*)     Globulin 3.7 (*)     Albumin/Globulin Ratio 0.8 (*)     Bilirubin Total 0.5      ALP 62      ALT 20      AST 38 (*)     eGFR >60      Anion Gap 13.0      BUN/Creatinine Ratio 11     URINALYSIS, REFLEX TO URINE CULTURE - Abnormal    Color, UA Yellow      Appearance, UA Clear      Specific Gravity, UA 1.025      pH, UA 6.0      Protein, UA Negative      Glucose, UA Negative      Ketones, UA Negative      Blood, UA Trace (*)     Bilirubin, UA Small (*)     Urobilinogen, UA 0.2      Nitrites, UA Negative       Leukocyte Esterase, UA Negative     URINALYSIS, MICROSCOPIC - Abnormal    Bacteria, UA Rare      RBC, UA 0-2      WBC, UA 0-2      Squamous Epithelial Cells, UA Few (*)     Narrative:     Urine microscopic results may be inaccurate, <3cc sample submitted. Microscopic performed on unspun urine   LACTIC ACID, PLASMA - Normal    Lactic Acid Level 1.5     COVID/FLU A&B PCR - Normal    Influenza A PCR Not Detected      Influenza B PCR Not Detected      SARS-CoV-2 PCR Not Detected      Narrative:     The Xpert Xpress SARS-CoV-2/FLU/RSV plus is a rapid, multiplexed real-time PCR test intended for the simultaneous qualitative detection and differentiation of SARS-CoV-2, Influenza A, Influenza B, and respiratory syncytial virus (RSV) viral RNA in either nasopharyngeal swab or nasal swab specimens.         STREP GROUP A BY PCR - Normal    STREP A PCR ([facility]) Not Detected      Narrative:     The Xpert Xpress Strep A test is a rapid, qualitative in vitro diagnostic test for the detection of Streptococcus pyogenes (Group A ß-hemolytic Streptococcus, Strep A) in throat swab specimens from patients with signs and symptoms of pharyngitis.     TROPONIN I - Normal    Troponin-I 0.026     BLOOD CULTURE [facility]   BLOOD CULTURE [facility]     EKG Readings: (Independently Interpreted)   Initial Reading: No STEMI. Rhythm: Normal Sinus Rhythm. Heart Rate: 85. T Waves: Normal. Clinical Impression: Normal Sinus Rhythm Other Impression: Anterior Q-waves noted     ECG Results              EKG 12-lead (In process)        Collection Time Result Time QRS Duration [facility] QTC Calculation    08/23/24 17:59:41 08/23/24 18:31:38 74 411                     In process by Interface, Lab In [facility] (08/23/24 18:31:42)                   Narrative:    Test Reason : R53.1,    Vent. Rate : 085 BPM     Atrial Rate : 085 BPM     P-R Int : 152 ms          QRS Dur : 074 ms      QT Int : 346 ms       P-R-T Axes : 063 006 -12 degrees     QTc Int : 411 ms    Normal sinus  rhythm  Anteroseptal infarct (cited on or before 23-AUG-2024)  Abnormal ECG  When compared with ECG of 06-JUN-2022 13:01,  Vent. rate has increased BY  31 BPM  Nonspecific T wave abnormality now evident in Inferior leads  T wave amplitude has decreased in Anterior-lateral leads    Referred By: ZHENG   SELF           Confirmed By:                                   Imaging Results              X-Ray Chest 1 View (Final result)  Result time 08/23/24 18:48:10      Final result by Atif Alberto MD (08/23/24 18:48:10)                   Impression:      Suspected mild infiltrates superimposed on left lower pleuroparenchymal scarring.      Electronically signed by: Atif Alberto  Date:    08/23/2024  Time:    18:48               Narrative:    EXAMINATION:  XR CHEST 1 VIEW    CLINICAL HISTORY:  fever;    TECHNIQUE:  One view    COMPARISON:  November 4, 2021..    FINDINGS:  Cardiopericardial silhouette is within normal limits.  Left lower pleuroparenchymal scarring with some calcifications was also present previous exam of 2021.  There are subtle new associated left lower lung opacity which are suspected to represent some new infiltrates.  Lungs otherwise are well expanded.  Clear.  There is no pulmonary edema.  Sternotomy changes.                                       Medications   aspirin EC tablet 81 mg (has no administration in time range)   atorvastatin tablet 40 mg (has no administration in time range)   clopidogreL tablet 75 mg (has no administration in time range)   finasteride tablet 5 mg (has no administration in time range)   fluticasone propionate 50 mcg/actuation nasal spray 50 mcg (has no administration in time range)   isosorbide dinitrate tablet 5 mg (has no administration in time range)   meclizine tablet 12.5 mg (has no administration in time range)   metoprolol tartrate (LOPRESSOR) tablet 25 mg (has no administration in time range)   ranolazine 12 hr tablet 500 mg (has no administration in time range)    sodium chloride 0.9% flush 10 mL (has no administration in time range)   naloxone 0.4 mg/mL injection 0.02 mg (has no administration in time range)   glucose chewable tablet 16 g (has no administration in time range)   glucose chewable tablet 24 g (has no administration in time range)   glucagon (human recombinant) injection 1 mg (has no administration in time range)   enoxaparin injection 40 mg (has no administration in time range)   acetaminophen tablet 650 mg (has no administration in time range)   ondansetron disintegrating tablet 8 mg (has no administration in time range)   dextrose 10% bolus 125 mL 125 mL (has no administration in time range)   dextrose 10% bolus 250 mL 250 mL (has no administration in time range)   cefTRIAXone (Rocephin) 1 g in D5W 100 mL IVPB (MB+) (has no administration in time range)   azithromycin tablet 500 mg (has no administration in time range)   lactated ringers bolus 1,000 mL (0 mLs Intravenous Stopped 8/23/24 1929)   acetaminophen tablet 1,000 mg (1,000 mg Oral Given 8/23/24 1829)   cefTRIAXone (Rocephin) 1 g in D5W 100 mL IVPB (MB+) (0 g Intravenous Stopped 8/23/24 2023)   azithromycin (ZITHROMAX) 500 mg in D5W 250 mL IVPB (Vial-Mate) (500 mg Intravenous New Bag 8/23/24 2027)     Medical Decision Making  See HPI for narrative    Differential diagnosis includes but is not limited to sepsis, viral syndrome, pneumonia, UTI    Amount and/or Complexity of Data Reviewed  Labs: ordered. Decision-making details documented in ED Course.  Radiology: ordered.    Risk  OTC drugs.  Decision regarding hospitalization.               ED Course as of 08/24/24 0342   Fri Aug 23, 2024   1851 Sodium(!): 129 [SH]   1852 CO2(!): 15 [SH]   1852 Glucose(!): 139 [SH]   1854 Transition of care at 7 PM. Dr. Jauregui will assume care and determine appropriate treatment, care of this patient as well as disposition.   [SH]   1907 Influenza A, Molecular: Not Detected [WC]   1907 Influenza B, Molecular: Not  Detected []   1907 SARS-CoV2 (COVID-19) Qualitative PCR: Not Detected [WC]   1912 Psi=97 hospitalization recommended []      ED Course User Index  [SH] Rosibel Contreras MD  [] Charles Jauregui MD                           Clinical Impression:  Final diagnoses:  [R53.1] Weakness  [J18.9] Pneumonia of left lower lobe due to infectious organism (Primary)          ED Disposition Condition    Admit Stable                Charles Jauregui MD  08/24/24 0345

## 2024-08-24 PROBLEM — I10 HTN (HYPERTENSION): Status: ACTIVE | Noted: 2024-08-24

## 2024-08-24 PROBLEM — I25.10 CAD (CORONARY ARTERY DISEASE): Status: ACTIVE | Noted: 2024-08-24

## 2024-08-24 LAB
ANION GAP SERPL CALC-SCNC: 8 MEQ/L
BASOPHILS # BLD AUTO: 0.02 X10(3)/MCL
BASOPHILS NFR BLD AUTO: 0.3 %
BUN SERPL-MCNC: 8.7 MG/DL (ref 8.4–25.7)
CALCIUM SERPL-MCNC: 7.6 MG/DL (ref 8.8–10)
CHLORIDE SERPL-SCNC: 103 MMOL/L (ref 98–107)
CO2 SERPL-SCNC: 19 MMOL/L (ref 23–31)
CREAT SERPL-MCNC: 0.87 MG/DL (ref 0.73–1.18)
CREAT/UREA NIT SERPL: 10
EOSINOPHIL # BLD AUTO: 0.04 X10(3)/MCL (ref 0–0.9)
EOSINOPHIL NFR BLD AUTO: 0.5 %
ERYTHROCYTE [DISTWIDTH] IN BLOOD BY AUTOMATED COUNT: 15.5 % (ref 11.5–17)
GFR SERPLBLD CREATININE-BSD FMLA CKD-EPI: >60 ML/MIN/1.73/M2
GLUCOSE SERPL-MCNC: 126 MG/DL (ref 82–115)
HCT VFR BLD AUTO: 39.4 % (ref 42–52)
HGB BLD-MCNC: 13.7 G/DL (ref 14–18)
IMM GRANULOCYTES # BLD AUTO: 0.03 X10(3)/MCL (ref 0–0.04)
IMM GRANULOCYTES NFR BLD AUTO: 0.4 %
LYMPHOCYTES # BLD AUTO: 0.52 X10(3)/MCL (ref 0.6–4.6)
LYMPHOCYTES NFR BLD AUTO: 7.1 %
MCH RBC QN AUTO: 28 PG (ref 27–31)
MCHC RBC AUTO-ENTMCNC: 34.8 G/DL (ref 33–36)
MCV RBC AUTO: 80.6 FL (ref 80–94)
MONOCYTES # BLD AUTO: 1.03 X10(3)/MCL (ref 0.1–1.3)
MONOCYTES NFR BLD AUTO: 14.1 %
NEUTROPHILS # BLD AUTO: 5.64 X10(3)/MCL (ref 2.1–9.2)
NEUTROPHILS NFR BLD AUTO: 77.6 %
NRBC BLD AUTO-RTO: 0 %
PLATELET # BLD AUTO: 86 X10(3)/MCL (ref 130–400)
PLATELETS.RETICULATED NFR BLD AUTO: 9.4 % (ref 0.9–11.2)
PMV BLD AUTO: 9.9 FL (ref 7.4–10.4)
POTASSIUM SERPL-SCNC: 3.8 MMOL/L (ref 3.5–5.1)
RBC # BLD AUTO: 4.89 X10(6)/MCL (ref 4.7–6.1)
SODIUM SERPL-SCNC: 130 MMOL/L (ref 136–145)
WBC # BLD AUTO: 7.28 X10(3)/MCL (ref 4.5–11.5)

## 2024-08-24 PROCEDURE — 25000242 PHARM REV CODE 250 ALT 637 W/ HCPCS: Performed by: STUDENT IN AN ORGANIZED HEALTH CARE EDUCATION/TRAINING PROGRAM

## 2024-08-24 PROCEDURE — 11000001 HC ACUTE MED/SURG PRIVATE ROOM

## 2024-08-24 PROCEDURE — 25000003 PHARM REV CODE 250: Performed by: STUDENT IN AN ORGANIZED HEALTH CARE EDUCATION/TRAINING PROGRAM

## 2024-08-24 PROCEDURE — 85025 COMPLETE CBC W/AUTO DIFF WBC: CPT | Performed by: STUDENT IN AN ORGANIZED HEALTH CARE EDUCATION/TRAINING PROGRAM

## 2024-08-24 PROCEDURE — 94761 N-INVAS EAR/PLS OXIMETRY MLT: CPT

## 2024-08-24 PROCEDURE — 97161 PT EVAL LOW COMPLEX 20 MIN: CPT

## 2024-08-24 PROCEDURE — 36415 COLL VENOUS BLD VENIPUNCTURE: CPT | Performed by: STUDENT IN AN ORGANIZED HEALTH CARE EDUCATION/TRAINING PROGRAM

## 2024-08-24 PROCEDURE — 97116 GAIT TRAINING THERAPY: CPT

## 2024-08-24 PROCEDURE — 99900031 HC PATIENT EDUCATION (STAT)

## 2024-08-24 PROCEDURE — 25000003 PHARM REV CODE 250: Performed by: INTERNAL MEDICINE

## 2024-08-24 PROCEDURE — 63600175 PHARM REV CODE 636 W HCPCS: Performed by: STUDENT IN AN ORGANIZED HEALTH CARE EDUCATION/TRAINING PROGRAM

## 2024-08-24 PROCEDURE — 80048 BASIC METABOLIC PNL TOTAL CA: CPT | Performed by: STUDENT IN AN ORGANIZED HEALTH CARE EDUCATION/TRAINING PROGRAM

## 2024-08-24 PROCEDURE — 63700000 PHARM REV CODE 250 ALT 637 W/O HCPCS: Performed by: STUDENT IN AN ORGANIZED HEALTH CARE EDUCATION/TRAINING PROGRAM

## 2024-08-24 PROCEDURE — 87070 CULTURE OTHR SPECIMN AEROBIC: CPT | Performed by: STUDENT IN AN ORGANIZED HEALTH CARE EDUCATION/TRAINING PROGRAM

## 2024-08-24 RX ORDER — BRIMONIDINE TARTRATE 1.5 MG/ML
1 SOLUTION/ DROPS OPHTHALMIC
Status: DISCONTINUED | OUTPATIENT
Start: 2024-08-24 | End: 2024-08-25 | Stop reason: HOSPADM

## 2024-08-24 RX ORDER — SODIUM BICARBONATE 650 MG/1
1300 TABLET ORAL ONCE
Status: COMPLETED | OUTPATIENT
Start: 2024-08-24 | End: 2024-08-24

## 2024-08-24 RX ORDER — TRAMADOL HYDROCHLORIDE 50 MG/1
50 TABLET ORAL EVERY 6 HOURS PRN
COMMUNITY

## 2024-08-24 RX ORDER — BRIMONIDINE TARTRATE AND TIMOLOL MALEATE 2; 5 MG/ML; MG/ML
1 SOLUTION OPHTHALMIC
Status: DISCONTINUED | OUTPATIENT
Start: 2024-08-24 | End: 2024-08-24 | Stop reason: CLARIF

## 2024-08-24 RX ORDER — TIMOLOL MALEATE 5 MG/ML
1 SOLUTION/ DROPS OPHTHALMIC
Status: DISCONTINUED | OUTPATIENT
Start: 2024-08-24 | End: 2024-08-25 | Stop reason: HOSPADM

## 2024-08-24 RX ADMIN — METOPROLOL TARTRATE 25 MG: 25 TABLET, FILM COATED ORAL at 09:08

## 2024-08-24 RX ADMIN — SODIUM BICARBONATE 1300 MG: 650 TABLET ORAL at 12:08

## 2024-08-24 RX ADMIN — CEFTRIAXONE SODIUM 1 G: 1 INJECTION, POWDER, FOR SOLUTION INTRAMUSCULAR; INTRAVENOUS at 09:08

## 2024-08-24 RX ADMIN — RANOLAZINE 500 MG: 500 TABLET, EXTENDED RELEASE ORAL at 09:08

## 2024-08-24 RX ADMIN — METOPROLOL TARTRATE 25 MG: 25 TABLET, FILM COATED ORAL at 08:08

## 2024-08-24 RX ADMIN — ACETAMINOPHEN 650 MG: 325 TABLET ORAL at 04:08

## 2024-08-24 RX ADMIN — RANOLAZINE 500 MG: 500 TABLET, EXTENDED RELEASE ORAL at 01:08

## 2024-08-24 RX ADMIN — ASPIRIN 81 MG: 81 TABLET, COATED ORAL at 09:08

## 2024-08-24 RX ADMIN — AZITHROMYCIN DIHYDRATE 500 MG: 250 TABLET ORAL at 09:08

## 2024-08-24 RX ADMIN — METOPROLOL TARTRATE 25 MG: 25 TABLET, FILM COATED ORAL at 01:08

## 2024-08-24 RX ADMIN — CLOPIDOGREL BISULFATE 75 MG: 75 TABLET ORAL at 09:08

## 2024-08-24 RX ADMIN — RANOLAZINE 500 MG: 500 TABLET, EXTENDED RELEASE ORAL at 08:08

## 2024-08-24 RX ADMIN — FINASTERIDE 5 MG: 5 TABLET, FILM COATED ORAL at 09:08

## 2024-08-24 RX ADMIN — ATORVASTATIN CALCIUM 40 MG: 40 TABLET, FILM COATED ORAL at 08:08

## 2024-08-24 RX ADMIN — FLUTICASONE PROPIONATE 50 MCG: 50 SPRAY, METERED NASAL at 09:08

## 2024-08-24 NOTE — SUBJECTIVE & OBJECTIVE
Past Medical History:   Diagnosis Date    HLD (hyperlipidemia)     HTN (hypertension)     PAD (peripheral artery disease)     Past heart attack        Past Surgical History:   Procedure Laterality Date    CORONARY ANGIOPLASTY WITH STENT PLACEMENT      HERNIA REPAIR      PERIPHERAL ANGIOGRAPHY N/A 4/10/2024    Procedure: Peripheral angiography;  Surgeon: Alex Crawford MD;  Location: [facility] CATH LAB;  Service: Cardiology;  Laterality: N/A;  BIRGIT ANGIO *WILL NEED GORE & SHOCKWAVE*    PROSTATE SURGERY         Review of patient's allergies indicates:  No Known Allergies    No current facility-administered medications on file prior to encounter.     Current Outpatient Medications on File Prior to Encounter   Medication Sig    acetaZOLAMIDE (DIAMOX) 500 mg CpSR Take 500 mg by mouth 2 (two) times daily.    aspirin (ECOTRIN) 81 MG EC tablet Take 81 mg by mouth once daily.    atorvastatin (LIPITOR) 40 MG tablet SMARTSI Tablet(s) By Mouth Every Evening    clopidogreL (PLAVIX) 75 mg tablet Take 1 tablet (75 mg total) by mouth once daily.    COMBIGAN 0.2-0.5 % Drop Place 1 drop into both eyes 2 (two) times daily.    finasteride (PROSCAR) 5 mg tablet Take 5 mg by mouth once daily.    fluticasone propionate (FLONASE) 50 mcg/actuation nasal spray 1 spray (50 mcg total) by Each Nostril route 2 (two) times daily as needed for Rhinitis or Allergies.    isosorbide dinitrate (ISORDIL) 5 MG Tab Take 5 mg by mouth 2 (two) times daily.    meclizine (ANTIVERT) 12.5 mg tablet Take 1 tablet (12.5 mg total) by mouth 3 (three) times daily as needed for Dizziness.    metoprolol tartrate (LOPRESSOR) 25 MG tablet Take 25 mg by mouth 2 (two) times daily.    ranolazine (RANEXA) 500 MG Tb12 Take 500 mg by mouth 2 (two) times daily.    sildenafiL (VIAGRA) 100 MG tablet Take 100 mg by mouth daily as needed.    [] sulfamethoxazole-trimethoprim 800-160mg (BACTRIM DS) 800-160 mg Tab Take 1 tablet by mouth 2 (two) times daily. for 10 days     VYZULTA 0.024 % Drop Place 1 drop into both eyes nightly.     Family History    None       Tobacco Use    Smoking status: Former     Types: Cigarettes    Smokeless tobacco: Never   Substance and Sexual Activity    Alcohol use: Yes     Comment: daily    Drug use: Not on file    Sexual activity: Not Currently     Review of Systems   Constitutional:  Positive for fatigue. Negative for chills and fever.   HENT:  Negative for congestion.    Respiratory:  Negative for cough and shortness of breath.    Cardiovascular:  Negative for chest pain and leg swelling.   Gastrointestinal:  Negative for abdominal pain.   Genitourinary:  Negative for dysuria.   Musculoskeletal:  Negative for arthralgias.   Neurological:  Positive for weakness.     Objective:     Vital Signs (Most Recent):  Temp: 98.7 °F (37.1 °C) (08/24/24 0719)  Pulse: 65 (08/24/24 0719)  Resp: 16 (08/24/24 0719)  BP: 101/60 (08/24/24 0719)  SpO2: 96 % (08/24/24 0719) Vital Signs (24h Range):  Temp:  [97.5 °F (36.4 °C)-101.5 °F (38.6 °C)] 98.7 °F (37.1 °C)  Pulse:  [54-92] 65  Resp:  [16-18] 16  SpO2:  [95 %-99 %] 96 %  BP: (101-165)/(57-80) 101/60     Weight: 72.6 kg (160 lb)  Body mass index is 21.11 kg/m².     Physical Exam  Vitals reviewed.   Constitutional:       Appearance: Normal appearance.   HENT:      Head: Normocephalic and atraumatic.   Eyes:      General: No scleral icterus.     Conjunctiva/sclera: Conjunctivae normal.   Pulmonary:      Effort: Pulmonary effort is normal. No respiratory distress.   Skin:     Coloration: Skin is not jaundiced.      Findings: No erythema.   Neurological:      General: No focal deficit present.      Mental Status: He is alert and oriented to person, place, and time.   Psychiatric:         Mood and Affect: Mood normal.         Behavior: Behavior normal.                Significant Labs: All pertinent labs within the past 24 hours have been reviewed.  Recent Lab Results         08/24/24  0707   08/23/24  1818   08/23/24  1807         Influenza A, Molecular     Not Detected       Influenza B, Molecular     Not Detected       Immature Platelet Fraction 9.4     8.8       Albumin/Globulin Ratio     0.8       Albumin     3.1       ALP     62       ALT     20       Anion Gap     13.0       Appearance, UA   Clear         AST     38       Bacteria, UA   Rare         Baso # 0.02     0.01       Basophil % 0.3     0.1       Bilirubin (UA)   Small         BILIRUBIN TOTAL     0.5       BUN     11.7       BUN/CREAT RATIO     11       Calcium     8.5       Chloride     101       CO2     15       Color, UA   Yellow         Creatinine     1.11       eGFR     >60       Eos # 0.04     0.02       Eos % 0.5     0.3       Globulin, Total     3.7       Glucose     139       Glucose, UA   Negative         Hematocrit 39.4     41.3       Hemoglobin 13.7     14.3       Immature Grans (Abs) 0.03     0.03       Immature Granulocytes 0.4     0.4       Ketones, UA   Negative         Lactic Acid Level     1.5       Leukocyte Esterase, UA   Negative         Lymph # 0.52     0.58       LYMPH % 7.1     8.4       MCH 28.0     27.7       MCHC 34.8     34.6       MCV 80.6     80.0       Mono # 1.03     0.87       Mono % 14.1     12.6       MPV 9.9     10.3       Neut # 5.64     5.39       Neut % 77.6     78.2       NITRITE UA   Negative         nRBC 0.0     0.0       Blood, UA   Trace         pH, UA   6.0         Platelet Count 86     97       Potassium     3.9       PROTEIN TOTAL     6.8       Protein, UA   Negative         RBC 4.89     5.16       RBC, UA   0-2         RDW 15.5     15.6       SARS-CoV2 (COVID-19) Qualitative PCR     Not Detected       Sodium     129       Specific Gravity,UA   1.025         Squam Epithel, UA   Few         STREP A PCR ([facility])     Not Detected       Troponin I     0.026       Urobilinogen, UA   0.2         WBC, UA   0-2         WBC 7.28     6.90               Significant Imaging: I have reviewed all pertinent imaging results/findings within the  past 24 hours.

## 2024-08-24 NOTE — H&P
[facility] Medicine  History & Physical    Patient Name: Joanna Cancino  MRN: [id number]  Admission Date: 2024  Attending Physician: Fabian Dunbar MD   Primary Care Provider: Selina Bernstein [facility] -         Patient information was obtained from patient and ER records.       Subjective:     Principal Problem:Pneumonia of left lower lobe due to infectious organism    Chief Complaint:   Chief Complaint   Patient presents with    Fatigue     c/o increased fatigue/ weakness over the past month. Hx cardiac stents and emphysema        HPI: 79 year old male with PMHx of CAD s/p stent placement, PVD, HTN who presents to the ED with generalized weakness. He denied any chest pain, shortness of breath, productive cough or fevers/chills at home. Workup in the ED notable for fever and CXR concerning for pneumonia. Patient started on IV CTX and azithromycin however has remained stable on room air.    Past Medical History:   Diagnosis Date    HLD (hyperlipidemia)     HTN (hypertension)     PAD (peripheral artery disease)     Past heart attack        Past Surgical History:   Procedure Laterality Date    CORONARY ANGIOPLASTY WITH STENT PLACEMENT      HERNIA REPAIR      PERIPHERAL ANGIOGRAPHY N/A 4/10/2024    Procedure: Peripheral angiography;  Surgeon: Alex Crawford MD;  Location: [facility] CATH LAB;  Service: Cardiology;  Laterality: N/A;  BIRGIT ANGIO *WILL NEED GORE & SHOCKWAVE*    PROSTATE SURGERY         Review of patient's allergies indicates:  No Known Allergies    No current facility-administered medications on file prior to encounter.     Current Outpatient Medications on File Prior to Encounter   Medication Sig    acetaZOLAMIDE (DIAMOX) 500 mg CpSR Take 500 mg by mouth 2 (two) times daily.    aspirin (ECOTRIN) 81 MG EC tablet Take 81 mg by mouth once daily.    atorvastatin (LIPITOR) 40 MG tablet SMARTSI Tablet(s) By Mouth Every Evening    clopidogreL  (PLAVIX) 75 mg tablet Take 1 tablet (75 mg total) by mouth once daily.    COMBIGAN 0.2-0.5 % Drop Place 1 drop into both eyes 2 (two) times daily.    finasteride (PROSCAR) 5 mg tablet Take 5 mg by mouth once daily.    fluticasone propionate (FLONASE) 50 mcg/actuation nasal spray 1 spray (50 mcg total) by Each Nostril route 2 (two) times daily as needed for Rhinitis or Allergies.    isosorbide dinitrate (ISORDIL) 5 MG Tab Take 5 mg by mouth 2 (two) times daily.    meclizine (ANTIVERT) 12.5 mg tablet Take 1 tablet (12.5 mg total) by mouth 3 (three) times daily as needed for Dizziness.    metoprolol tartrate (LOPRESSOR) 25 MG tablet Take 25 mg by mouth 2 (two) times daily.    ranolazine (RANEXA) 500 MG Tb12 Take 500 mg by mouth 2 (two) times daily.    sildenafiL (VIAGRA) 100 MG tablet Take 100 mg by mouth daily as needed.    [] sulfamethoxazole-trimethoprim 800-160mg (BACTRIM DS) 800-160 mg Tab Take 1 tablet by mouth 2 (two) times daily. for 10 days    VYZULTA 0.024 % Drop Place 1 drop into both eyes nightly.     Family History    None       Tobacco Use    Smoking status: Former     Types: Cigarettes    Smokeless tobacco: Never   Substance and Sexual Activity    Alcohol use: Yes     Comment: daily    Drug use: Not on file    Sexual activity: Not Currently     Review of Systems   Constitutional:  Positive for fatigue. Negative for chills and fever.   HENT:  Negative for congestion.    Respiratory:  Negative for cough and shortness of breath.    Cardiovascular:  Negative for chest pain and leg swelling.   Gastrointestinal:  Negative for abdominal pain.   Genitourinary:  Negative for dysuria.   Musculoskeletal:  Negative for arthralgias.   Neurological:  Positive for weakness.     Objective:     Vital Signs (Most Recent):  Temp: 98.7 °F (37.1 °C) (24)  Pulse: 65 (24)  Resp: 16 (24)  BP: 101/60 (24)  SpO2: 96 % (24) Vital Signs (24h Range):  Temp:  [97.5 °F  (36.4 °C)-101.5 °F (38.6 °C)] 98.7 °F (37.1 °C)  Pulse:  [54-92] 65  Resp:  [16-18] 16  SpO2:  [95 %-99 %] 96 %  BP: (101-165)/(57-80) 101/60     Weight: 72.6 kg (160 lb)  Body mass index is 21.11 kg/m².     Physical Exam  Vitals reviewed.   Constitutional:       Appearance: Normal appearance.   HENT:      Head: Normocephalic and atraumatic.   Eyes:      General: No scleral icterus.     Conjunctiva/sclera: Conjunctivae normal.   Pulmonary:      Effort: Pulmonary effort is normal. No respiratory distress.   Skin:     Coloration: Skin is not jaundiced.      Findings: No erythema.   Neurological:      General: No focal deficit present.      Mental Status: He is alert and oriented to person, place, and time.   Psychiatric:         Mood and Affect: Mood normal.         Behavior: Behavior normal.                Significant Labs: All pertinent labs within the past 24 hours have been reviewed.  Recent Lab Results         08/24/24  0707   08/23/24  1818   08/23/24  1807        Influenza A, Molecular     Not Detected       Influenza B, Molecular     Not Detected       Immature Platelet Fraction 9.4     8.8       Albumin/Globulin Ratio     0.8       Albumin     3.1       ALP     62       ALT     20       Anion Gap     13.0       Appearance, UA   Clear         AST     38       Bacteria, UA   Rare         Baso # 0.02     0.01       Basophil % 0.3     0.1       Bilirubin (UA)   Small         BILIRUBIN TOTAL     0.5       BUN     11.7       BUN/CREAT RATIO     11       Calcium     8.5       Chloride     101       CO2     15       Color, UA   Yellow         Creatinine     1.11       eGFR     >60       Eos # 0.04     0.02       Eos % 0.5     0.3       Globulin, Total     3.7       Glucose     139       Glucose, UA   Negative         Hematocrit 39.4     41.3       Hemoglobin 13.7     14.3       Immature Grans (Abs) 0.03     0.03       Immature Granulocytes 0.4     0.4       Ketones, UA   Negative         Lactic Acid Level     1.5        Leukocyte Esterase, UA   Negative         Lymph # 0.52     0.58       LYMPH % 7.1     8.4       MCH 28.0     27.7       MCHC 34.8     34.6       MCV 80.6     80.0       Mono # 1.03     0.87       Mono % 14.1     12.6       MPV 9.9     10.3       Neut # 5.64     5.39       Neut % 77.6     78.2       NITRITE UA   Negative         nRBC 0.0     0.0       Blood, UA   Trace         pH, UA   6.0         Platelet Count 86     97       Potassium     3.9       PROTEIN TOTAL     6.8       Protein, UA   Negative         RBC 4.89     5.16       RBC, UA   0-2         RDW 15.5     15.6       SARS-CoV2 (COVID-19) Qualitative PCR     Not Detected       Sodium     129       Specific Gravity,UA   1.025         Squam Epithel, UA   Few         STREP A PCR ([facility])     Not Detected       Troponin I     0.026       Urobilinogen, UA   0.2         WBC, UA   0-2         WBC 7.28     6.90               Significant Imaging: I have reviewed all pertinent imaging results/findings within the past 24 hours.  Assessment/Plan:     * Pneumonia of left lower lobe due to infectious organism  As seen on chest Xray, patient stable on room air however admitted due to high PSI score  Continue IV CTX and azithromycin  PRN inhalers/duonebs      HTN (hypertension)  Chronic and stable  Continue home meds      CAD (coronary artery disease)  Continue home aspirin, statin, and plavix        VTE Risk Mitigation (From admission, onward)           Ordered     enoxaparin injection 40 mg  Daily         08/23/24 2350     IP VTE HIGH RISK PATIENT  Once         08/23/24 2350     Place sequential compression device  Until discontinued         08/23/24 2350                         The attending portion of this evaluation, treatment, and documentation was performed per Bob Giordano MD via Telemedicine AudioVisual using the secure [employer] software platform with 2 way audio/video. The provider was located off-site and the patient is located in the hospital. The  aforementioned video software was utilized to document the relevant history and physical exam            Bob Giordano MD  Department of Hospital Medicine   [facility] - [facility]

## 2024-08-24 NOTE — ASSESSMENT & PLAN NOTE
As seen on chest Xray, patient stable on room air however admitted due to high PSI score  Continue IV CTX and azithromycin  PRN inhalers/duonebs

## 2024-08-24 NOTE — NURSING
Nurses Note -- 4 Eyes      8/24/2024   06:41 AM      Skin assessed during: Q Shift Change      [] No Altered Skin Integrity Present    []Prevention Measures Documented      [] Yes- Altered Skin Integrity Present or Discovered   [] LDA Added if Not in Epic (Describe Wound)   [] New Altered Skin Integrity was Present on Admit and Documented in LDA   [] Wound Image Taken    Wound Care Consulted? No    Attending Nurse:  Shabana Paige RN/Staff Member:  Heather            n/a

## 2024-08-24 NOTE — PT/OT/SLP EVAL
Physical Therapy Evaluation and Discharge Note    Patient Name:  Joanna Cancino   MRN:  [id number]    Recommendations:     Discharge Recommendations: Moderate Intensity Therapy  Discharge Equipment Recommendations: walker, rolling   Barriers to discharge: None    Assessment:     Joanna Cancino is a 79 y.o. male admitted with a medical diagnosis of Pneumonia of left lower lobe due to infectious organism. .  At this time, patient is functioning at their prior level of function and does not require further acute PT services.     Recent Surgery: * No surgery found *      Plan:     During this hospitalization, patient does not require further acute PT services.  Please re-consult if situation changes.      Subjective     Chief Complaint: No complaints  Patient/Family Comments/goals: To go home  Pain/Comfort:       Patients cultural, spiritual, Christianity conflicts given the current situation: no    Living Environment:  Pt lives with daughter and granddaughter  Prior to admission, patients level of function was independent.  Equipment used at home: cane, straight.  DME owned (not currently used): single point cane.  Upon discharge, patient will have assistance from Daughter and Granddaughter.    Objective:     Communicated with NSG prior to session.  Patient found supine with   upon PT entry to room.    General Precautions: Standard, fall    Orthopedic Precautions:N/A   Braces:    Respiratory Status: Room air    Exams:  RUE ROM: WFL  RUE Strength: WFL  LUE ROM: WFL  LUE Strength: WFL  RLE ROM: WFL  RLE Strength: WFL  LLE ROM: WFL  LLE Strength: WFL    Functional Mobility:  Bed Mobility:     Rolling Right: modified independence  Scooting: independence  Supine to Sit: modified independence  Sit to Supine: independence  Transfers:     Sit to Stand:  modified independence with no AD  Gait: 225 ft with SPC SBA to CGA    AM-PAC 6 CLICK MOBILITY  Total Score:        Treatment and Education:  Pt diagnosed with pneumonia. Pt reports  feeling a lot better since getting his medicine. Pt Reji to I with bed mobility, Dons/doffs shoes and ambulates well with SPC. Pt has mild LOB that was corrected Jamey after standing. No lob with gait. Performed well overall and does not require skilled care at this time.     AM-PAC 6 CLICK MOBILITY  Total Score:      Patient left supine with call button in reach.    GOALS:   Multidisciplinary Problems       Physical Therapy Goals       Not on file                    History:     Past Medical History:   Diagnosis Date    HLD (hyperlipidemia)     HTN (hypertension)     PAD (peripheral artery disease)     Past heart attack        Past Surgical History:   Procedure Laterality Date    CORONARY ANGIOPLASTY WITH STENT PLACEMENT      HERNIA REPAIR      PERIPHERAL ANGIOGRAPHY N/A 4/10/2024    Procedure: Peripheral angiography;  Surgeon: Alex Crawford MD;  Location: [facility] CATH LAB;  Service: Cardiology;  Laterality: N/A;  BIRGIT ANGIO *WILL NEED GORE & SHOCKWAVE*    PROSTATE SURGERY         Time Tracking:     PT Received On: 08/24/24  PT Start Time: 1035     PT Stop Time: 1055  PT Total Time (min): 20 min     Billable Minutes: Evaluation 10 and Gait Training 10      08/24/2024

## 2024-08-24 NOTE — HPI
79 year old male with PMHx of CAD s/p stent placement, PVD, HTN who presents to the ED with generalized weakness. He denied any chest pain, shortness of breath, productive cough or fevers/chills at home. Workup in the ED notable for fever and CXR concerning for pneumonia. Patient started on IV CTX and azithromycin however has remained stable on room air.

## 2024-08-24 NOTE — PROGRESS NOTES
[facility] Hospital Medicine Progress Note      Patient Name: Joanna Cancino  MRN: [id number]  Admission Date: 8/23/2024   Length of Stay: 1  Attending Physician: Fabian Dunbar MD  Primary Care Provider: Selina Bernstein [facility] -  Face-to-Face encounter date: 08/24/2024    Code Status: Full Code        Chief Complaint:   Fatigue (c/o increased fatigue/ weakness over the past month. Hx cardiac stents and emphysema)        HPI:   79 year old male with PMHx of CAD s/p stent placement, PVD, HTN who presents to the ED with generalized weakness. He denied any chest pain, shortness of breath, productive cough or fevers/chills at home. Workup in the ED notable for fever and CXR concerning for pneumonia. Patient started on IV CTX and azithromycin however has remained stable on room air.     Overview/Hospital Course:  No notes on file       Interval Hx:   No cough/sob.  Low grade fever    Review of Systems   All other systems reviewed and are negative.      Objective/physical exam:  General: In no acute distress, afebrile  Chest: Clear to auscultation bilaterally  Heart: RRR, +S1, S2, no appreciable murmur  Abdomen: Soft, nontender, BS +  MSK: Warm, no lower extremity edema, no clubbing or cyanosis  Neurologic: Alert and oriented x4, Cranial nerve II-XII intact, Strength 5/5 in all 4 extremities    VITAL SIGNS: 24 HRS MIN & MAX LAST   Temp  Min: 97.5 °F (36.4 °C)  Max: 101.5 °F (38.6 °C) 98.7 °F (37.1 °C)   BP  Min: 101/60  Max: 165/57 101/60   Pulse  Min: 54  Max: 92  65   Resp  Min: 16  Max: 18 16   SpO2  Min: 95 %  Max: 99 % 96 %       Recent Labs   Lab 08/23/24  1807 08/24/24  0707   WBC 6.90 7.28   RBC 5.16 4.89   HGB 14.3 13.7*   HCT 41.3* 39.4*   MCV 80.0 80.6   MCH 27.7 28.0   MCHC 34.6 34.8   RDW 15.6 15.5   PLT 97* 86*   MPV 10.3 9.9       Recent Labs   Lab 08/23/24 1807 08/24/24  0707   * 130*   K 3.9 3.8    103   CO2 15* 19*    BUN 11.7 8.7   CREATININE 1.11 0.87   CALCIUM 8.5* 7.6*   ALBUMIN 3.1*  --    ALKPHOS 62  --    ALT 20  --    AST 38*  --    BILITOT 0.5  --         Microbiology Results (last 7 days)       Procedure Component Value Units Date/Time    Respiratory Culture [[id number]] Collected: 08/24/24 0638    Order Status: Sent Specimen: Sputum, Expectorated Updated: 08/24/24 0639    Blood Culture [[id number]] Collected: 08/23/24 1904    Order Status: Resulted Specimen: Blood from Forearm, Right Updated: 08/23/24 1904    Blood Culture [[id number]] Collected: 08/23/24 1858    Order Status: Resulted Specimen: Blood from Antecubital, Right Updated: 08/23/24 1904             Radiology:  X-Ray Chest 1 View  Narrative: EXAMINATION:  XR CHEST 1 VIEW    CLINICAL HISTORY:  fever;    TECHNIQUE:  One view    COMPARISON:  November 4, 2021..    FINDINGS:  Cardiopericardial silhouette is within normal limits.  Left lower pleuroparenchymal scarring with some calcifications was also present previous exam of 2021.  There are subtle new associated left lower lung opacity which are suspected to represent some new infiltrates.  Lungs otherwise are well expanded.  Clear.  There is no pulmonary edema.  Sternotomy changes.  Impression: Suspected mild infiltrates superimposed on left lower pleuroparenchymal scarring.    Electronically signed by: Atif Alberto  Date:    08/23/2024  Time:    18:48      Scheduled Med:   aspirin  81 mg Oral Daily    atorvastatin  40 mg Oral Daily PM    azithromycin  500 mg Oral Daily    cefTRIAXone (Rocephin) IV (PEDS and ADULTS)  1 g Intravenous Q24H    clopidogreL  75 mg Oral Daily    enoxparin  40 mg Subcutaneous Daily    finasteride  5 mg Oral Daily    fluticasone propionate  1 spray Each Nostril Daily    isosorbide dinitrate  5 mg Oral BID    metoprolol tartrate  25 mg Oral BID    ranolazine  500 mg Oral BID        Continuous Infusions:       PRN Meds:    Current Facility-Administered Medications:     acetaminophen,  650 mg, Oral, Q4H PRN    dextrose 10%, 12.5 g, Intravenous, PRN    dextrose 10%, 25 g, Intravenous, PRN    glucagon (human recombinant), 1 mg, Intramuscular, PRN    glucose, 16 g, Oral, PRN    glucose, 24 g, Oral, PRN    meclizine, 12.5 mg, Oral, TID PRN    naloxone, 0.02 mg, Intravenous, PRN    ondansetron, 8 mg, Oral, Q8H PRN    sodium chloride 0.9%, 10 mL, Intravenous, Q12H PRN     Nutrition Status:      Assessment/Plan:  LLL pneumonia  Hx htn,cad    Plan    Rocephin/azithromycin  Duonebs  Home meds  Labs reviewed nabicarb  PT    Dvt proph: lovenox            All diagnosis and differential diagnosis have been reviewed; assessment and plan has been documented; I have personally reviewed the labs and test results that are presently available; I have reviewed the patients medication list; I have reviewed the consulting providers response and recommendations. I have reviewed or attempted to review medical records based upon their availability      _____________________________________________________________________            Fabian Dunbar MD   08/24/2024

## 2024-08-24 NOTE — NURSING
Nurses Note -- 4 Eyes      8/23/2024   10:22 PM      Skin assessed during: Q Shift Change      [x] No Altered Skin Integrity Present    []Prevention Measures Documented      [] Yes- Altered Skin Integrity Present or Discovered   [] LDA Added if Not in Epic (Describe Wound)   [] New Altered Skin Integrity was Present on Admit and Documented in LDA   [] Wound Image Taken    Wound Care Consulted? No    Attending Nurse:  Heather Paige RN/Staff Member:   NOELLE Hayden

## 2024-08-25 VITALS
HEIGHT: 73 IN | SYSTOLIC BLOOD PRESSURE: 116 MMHG | OXYGEN SATURATION: 98 % | WEIGHT: 160 LBS | RESPIRATION RATE: 18 BRPM | BODY MASS INDEX: 21.2 KG/M2 | DIASTOLIC BLOOD PRESSURE: 76 MMHG | HEART RATE: 72 BPM | TEMPERATURE: 97 F

## 2024-08-25 LAB
ANION GAP SERPL CALC-SCNC: 8 MEQ/L
BASOPHILS # BLD AUTO: 0.01 X10(3)/MCL
BASOPHILS NFR BLD AUTO: 0.2 %
BUN SERPL-MCNC: 9.9 MG/DL (ref 8.4–25.7)
CALCIUM SERPL-MCNC: 8.7 MG/DL (ref 8.8–10)
CHLORIDE SERPL-SCNC: 101 MMOL/L (ref 98–107)
CO2 SERPL-SCNC: 21 MMOL/L (ref 23–31)
CREAT SERPL-MCNC: 0.89 MG/DL (ref 0.73–1.18)
CREAT/UREA NIT SERPL: 11
EOSINOPHIL # BLD AUTO: 0.07 X10(3)/MCL (ref 0–0.9)
EOSINOPHIL NFR BLD AUTO: 1.1 %
ERYTHROCYTE [DISTWIDTH] IN BLOOD BY AUTOMATED COUNT: 15.7 % (ref 11.5–17)
GFR SERPLBLD CREATININE-BSD FMLA CKD-EPI: >60 ML/MIN/1.73/M2
GLUCOSE SERPL-MCNC: 117 MG/DL (ref 82–115)
HCT VFR BLD AUTO: 40.2 % (ref 42–52)
HGB BLD-MCNC: 13.8 G/DL (ref 14–18)
IMM GRANULOCYTES # BLD AUTO: 0.06 X10(3)/MCL (ref 0–0.04)
IMM GRANULOCYTES NFR BLD AUTO: 0.9 %
LYMPHOCYTES # BLD AUTO: 0.74 X10(3)/MCL (ref 0.6–4.6)
LYMPHOCYTES NFR BLD AUTO: 11.3 %
MCH RBC QN AUTO: 28 PG (ref 27–31)
MCHC RBC AUTO-ENTMCNC: 34.3 G/DL (ref 33–36)
MCV RBC AUTO: 81.5 FL (ref 80–94)
MONOCYTES # BLD AUTO: 1.14 X10(3)/MCL (ref 0.1–1.3)
MONOCYTES NFR BLD AUTO: 17.4 %
NEUTROPHILS # BLD AUTO: 4.52 X10(3)/MCL (ref 2.1–9.2)
NEUTROPHILS NFR BLD AUTO: 69.1 %
NRBC BLD AUTO-RTO: 0 %
OHS QRS DURATION: 74 MS
OHS QTC CALCULATION: 411 MS
PLATELET # BLD AUTO: 97 X10(3)/MCL (ref 130–400)
PLATELETS.RETICULATED NFR BLD AUTO: 10.3 % (ref 0.9–11.2)
PMV BLD AUTO: 10 FL (ref 7.4–10.4)
POTASSIUM SERPL-SCNC: 4.3 MMOL/L (ref 3.5–5.1)
RBC # BLD AUTO: 4.93 X10(6)/MCL (ref 4.7–6.1)
SODIUM SERPL-SCNC: 130 MMOL/L (ref 136–145)
WBC # BLD AUTO: 6.54 X10(3)/MCL (ref 4.5–11.5)

## 2024-08-25 PROCEDURE — 36415 COLL VENOUS BLD VENIPUNCTURE: CPT | Performed by: STUDENT IN AN ORGANIZED HEALTH CARE EDUCATION/TRAINING PROGRAM

## 2024-08-25 PROCEDURE — 63600175 PHARM REV CODE 636 W HCPCS: Performed by: STUDENT IN AN ORGANIZED HEALTH CARE EDUCATION/TRAINING PROGRAM

## 2024-08-25 PROCEDURE — 63700000 PHARM REV CODE 250 ALT 637 W/O HCPCS: Performed by: STUDENT IN AN ORGANIZED HEALTH CARE EDUCATION/TRAINING PROGRAM

## 2024-08-25 PROCEDURE — 25000003 PHARM REV CODE 250: Performed by: STUDENT IN AN ORGANIZED HEALTH CARE EDUCATION/TRAINING PROGRAM

## 2024-08-25 PROCEDURE — 25000003 PHARM REV CODE 250: Performed by: INTERNAL MEDICINE

## 2024-08-25 PROCEDURE — 80048 BASIC METABOLIC PNL TOTAL CA: CPT | Performed by: STUDENT IN AN ORGANIZED HEALTH CARE EDUCATION/TRAINING PROGRAM

## 2024-08-25 PROCEDURE — 85025 COMPLETE CBC W/AUTO DIFF WBC: CPT | Performed by: STUDENT IN AN ORGANIZED HEALTH CARE EDUCATION/TRAINING PROGRAM

## 2024-08-25 RX ORDER — ACETAMINOPHEN 325 MG/1
650 TABLET ORAL EVERY 4 HOURS PRN
COMMUNITY
Start: 2024-08-25

## 2024-08-25 RX ORDER — AMOXICILLIN AND CLAVULANATE POTASSIUM 875; 125 MG/1; MG/1
1 TABLET, FILM COATED ORAL 2 TIMES DAILY
Qty: 10 TABLET | Refills: 0 | Status: SHIPPED | OUTPATIENT
Start: 2024-08-25 | End: 2024-08-30

## 2024-08-25 RX ORDER — AZITHROMYCIN 250 MG/1
TABLET, FILM COATED ORAL
Qty: 6 TABLET | Refills: 0 | Status: SHIPPED | OUTPATIENT
Start: 2024-08-25 | End: 2024-08-30

## 2024-08-25 RX ADMIN — ASPIRIN 81 MG: 81 TABLET, COATED ORAL at 08:08

## 2024-08-25 RX ADMIN — CLOPIDOGREL BISULFATE 75 MG: 75 TABLET ORAL at 08:08

## 2024-08-25 RX ADMIN — FLUTICASONE PROPIONATE 50 MCG: 50 SPRAY, METERED NASAL at 08:08

## 2024-08-25 RX ADMIN — AZITHROMYCIN DIHYDRATE 500 MG: 250 TABLET ORAL at 08:08

## 2024-08-25 RX ADMIN — FINASTERIDE 5 MG: 5 TABLET, FILM COATED ORAL at 08:08

## 2024-08-25 RX ADMIN — CEFTRIAXONE SODIUM 1 G: 1 INJECTION, POWDER, FOR SOLUTION INTRAMUSCULAR; INTRAVENOUS at 08:08

## 2024-08-25 RX ADMIN — BRIMONIDINE TARTRATE 1 DROP: 1.5 SOLUTION/ DROPS OPHTHALMIC at 06:08

## 2024-08-25 RX ADMIN — TIMOLOL MALEATE 1 DROP: 5 SOLUTION/ DROPS OPHTHALMIC at 06:08

## 2024-08-25 RX ADMIN — ACETAMINOPHEN 650 MG: 325 TABLET ORAL at 03:08

## 2024-08-25 RX ADMIN — RANOLAZINE 500 MG: 500 TABLET, EXTENDED RELEASE ORAL at 08:08

## 2024-08-25 NOTE — NURSING
Written discharge instructions given to and reviewed, including meds, follow-up appointments, and precautions to take at home.  Patient verbalized understanding of instructions and all questions were addressed.

## 2024-08-25 NOTE — NURSING
Nurses Note -- 4 Eyes      8/25/2024   6:51 AM      Skin assessed during: Q Shift Change      [x] No Altered Skin Integrity Present    []Prevention Measures Documented      [] Yes- Altered Skin Integrity Present or Discovered   [] LDA Added if Not in Epic (Describe Wound)   [] New Altered Skin Integrity was Present on Admit and Documented in LDA   [] Wound Image Taken    Wound Care Consulted? No    Attending Nurse:  Shabana Paige RN/Staff Member:   Heather

## 2024-08-25 NOTE — NURSING
Nurses Note -- 4 Eyes      8/24/2024   9:04 PM      Skin assessed during: Q Shift Change      [x] No Altered Skin Integrity Present    []Prevention Measures Documented      [] Yes- Altered Skin Integrity Present or Discovered   [] LDA Added if Not in Epic (Describe Wound)   [] New Altered Skin Integrity was Present on Admit and Documented in LDA   [] Wound Image Taken    Wound Care Consulted? No    Attending Nurse:  Heather Paige RN/Staff Member:   NOELLE Donald

## 2024-08-25 NOTE — PLAN OF CARE
Problem: Adult Inpatient Plan of Care  Goal: Plan of Care Review  8/24/2024 2103 by Heather Collazo LPN  Outcome: Progressing  8/24/2024 1956 by Heather Collazo LPN  Outcome: Progressing  Goal: Patient-Specific Goal (Individualized)  8/24/2024 2103 by Heather Collazo LPN  Outcome: Progressing  8/24/2024 1956 by Heather Collazo LPN  Outcome: Progressing  Goal: Absence of Hospital-Acquired Illness or Injury  8/24/2024 2103 by Heather Collazo LPN  Outcome: Progressing  8/24/2024 1956 by Heather Collazo LPN  Outcome: Progressing  Goal: Optimal Comfort and Wellbeing  8/24/2024 2103 by Heather Collazo LPN  Outcome: Progressing  8/24/2024 1956 by Heather Collazo LPN  Outcome: Progressing  Goal: Readiness for Transition of Care  8/24/2024 2103 by Heather Collazo LPN  Outcome: Progressing  8/24/2024 1956 by Heather Collazo LPN  Outcome: Progressing     Problem: Pneumonia  Goal: Fluid Balance  8/24/2024 2103 by Heather Collazo LPN  Outcome: Progressing  8/24/2024 1956 by Heather Collazo LPN  Outcome: Progressing  Goal: Resolution of Infection Signs and Symptoms  8/24/2024 2103 by Heather Collazo LPN  Outcome: Progressing  8/24/2024 1956 by Heather Collazo LPN  Outcome: Progressing  Goal: Effective Oxygenation and Ventilation  8/24/2024 2103 by Heather Collazo LPN  Outcome: Progressing  8/24/2024 1956 by Heather Collazo LPN  Outcome: Progressing

## 2024-08-25 NOTE — DISCHARGE SUMMARY
"  Hospital Medicine Discharge Summary    Admit Date: 8/23/2024  Discharge Date and Time: 8/25/202411:37 AM  Admitting Physician: Fabian Dunbar MD   Discharge Attending Physician: Fabian Dunbar.  Primary Care Physician: Selina Bernstein [facility] -  Consults: Dr. Philip    Discharge Diagnoses:  Active Hospital Problems    Diagnosis  POA    *Pneumonia of left lower lobe due to infectious organism [J18.9]  Yes    CAD (coronary artery disease) [I25.10]  Yes    HTN (hypertension) [I10]  Yes      Resolved Hospital Problems   No resolved problems to display.        Hospital Course:   79 year old male with PMHx of CAD s/p stent placement, PVD, HTN who presents to the ED with generalized weakness. He denied any chest pain, shortness of breath, productive cough or fevers/chills at home. Workup in the ED notable for fever and CXR concerning for pneumonia. Patient started on IV CTX and azithromycin however has remained stable on room air.   Pt with no respiratory complaints, no f/c.  Ambulating with therapy, no weakness/dizziness.  Discharge home to complete 7 day course of antibiotics, augmentin/azithromycin.  F/u with pcp in 1 week.    /76   Pulse 72   Temp 97.3 °F (36.3 °C) (Oral)   Resp 18   Ht 6' 1" (1.854 m)   Wt 72.6 kg (160 lb)   SpO2 98%   BMI 21.11 kg/m²    Physical Exam   General: In no acute distress, afebrile  Chest: Clear to auscultation bilaterally  Heart: RRR, +S1, S2, no appreciable murmur  Abdomen: Soft, nontender, BS +  MSK: Warm, no lower extremity edema, no clubbing or cyanosis  Neurologic: Alert and oriented x4, Cranial nerve II-XII intact, Strength 5/5 in all 4 extremities  Procedures Performed: No admission procedures for hospital encounter.     Significant Diagnostic Studies: See Full reports for all details  Admission on 08/23/2024   Component Date Value Ref Range Status    Blood Culture 08/23/2024 No Growth At 24 Hours   Preliminary    WBC 08/23/2024 6.90  4.50 - " 11.50 x10(3)/mcL Final    RBC 08/23/2024 5.16  4.70 - 6.10 x10(6)/mcL Final    Hgb 08/23/2024 14.3  14.0 - 18.0 g/dL Final    Hct 08/23/2024 41.3 (L)  42.0 - 52.0 % Final    MCV 08/23/2024 80.0  80.0 - 94.0 fL Final    MCH 08/23/2024 27.7  27.0 - 31.0 pg Final    MCHC 08/23/2024 34.6  33.0 - 36.0 g/dL Final    RDW 08/23/2024 15.6  11.5 - 17.0 % Final    Platelet 08/23/2024 97 (L)  130 - 400 x10(3)/mcL Final    MPV 08/23/2024 10.3  7.4 - 10.4 fL Final    IPF 08/23/2024 8.8  0.9 - 11.2 % Final    Neut % 08/23/2024 78.2  % Final    Lymph % 08/23/2024 8.4  % Final    Mono % 08/23/2024 12.6  % Final    Eos % 08/23/2024 0.3  % Final    Basophil % 08/23/2024 0.1  % Final    Lymph # 08/23/2024 0.58 (L)  0.6 - 4.6 x10(3)/mcL Final    Neut # 08/23/2024 5.39  2.1 - 9.2 x10(3)/mcL Final    Mono # 08/23/2024 0.87  0.1 - 1.3 x10(3)/mcL Final    Eos # 08/23/2024 0.02  0 - 0.9 x10(3)/mcL Final    Baso # 08/23/2024 0.01  <=0.2 x10(3)/mcL Final    IG# 08/23/2024 0.03  0 - 0.04 x10(3)/mcL Final    IG% 08/23/2024 0.4  % Final    NRBC% 08/23/2024 0.0  % Final    Sodium 08/23/2024 129 (L)  136 - 145 mmol/L Final    Potassium 08/23/2024 3.9  3.5 - 5.1 mmol/L Final    Chloride 08/23/2024 101  98 - 107 mmol/L Final    CO2 08/23/2024 15 (L)  23 - 31 mmol/L Final    Glucose 08/23/2024 139 (H)  82 - 115 mg/dL Final    Blood Urea Nitrogen 08/23/2024 11.7  8.4 - 25.7 mg/dL Final    Creatinine 08/23/2024 1.11  0.73 - 1.18 mg/dL Final    Calcium 08/23/2024 8.5 (L)  8.8 - 10.0 mg/dL Final    Protein Total 08/23/2024 6.8  5.8 - 7.6 gm/dL Final    Albumin 08/23/2024 3.1 (L)  3.4 - 4.8 g/dL Final    Globulin 08/23/2024 3.7 (H)  2.4 - 3.5 gm/dL Final    Albumin/Globulin Ratio 08/23/2024 0.8 (L)  1.1 - 2.0 ratio Final    Bilirubin Total 08/23/2024 0.5  <=1.5 mg/dL Final    ALP 08/23/2024 62  40 - 150 unit/L Final    ALT 08/23/2024 20  0 - 55 unit/L Final    AST 08/23/2024 38 (H)  5 - 34 unit/L Final    eGFR 08/23/2024 >60  mL/min/1.73/m2 Final    Anion  Gap 08/23/2024 13.0  mEq/L Final    BUN/Creatinine Ratio 08/23/2024 11   Final    Color, UA 08/23/2024 Yellow  Yellow, Light-Yellow, Dark Yellow, Alexandra, Straw Final    Appearance, UA 08/23/2024 Clear  Clear Final    Specific Gravity, UA 08/23/2024 1.025  1.005 - 1.030 Final    pH, UA 08/23/2024 6.0  5.0 - 8.5 Final    Protein, UA 08/23/2024 Negative  Negative Final    Glucose, UA 08/23/2024 Negative  Negative, Normal Final    Ketones, UA 08/23/2024 Negative  Negative Final    Blood, UA 08/23/2024 Trace (A)  Negative Final    Bilirubin, UA 08/23/2024 Small (A)  Negative Final    Urobilinogen, UA 08/23/2024 0.2  0.2, 1.0, Normal Final    Nitrites, UA 08/23/2024 Negative  Negative Final    Leukocyte Esterase, UA 08/23/2024 Negative  Negative Final    Lactic Acid Level 08/23/2024 1.5  0.5 - 2.2 mmol/L Final    Influenza A PCR 08/23/2024 Not Detected  Not Detected Final    Influenza B PCR 08/23/2024 Not Detected  Not Detected Final    SARS-CoV-2 PCR 08/23/2024 Not Detected  Not Detected, Negative Final    STREP A PCR ([facility]) 08/23/2024 Not Detected  Not Detected Final    QRS Duration 08/23/2024 74  ms Final    [facility] QTC Calculation 08/23/2024 411  ms Final    Blood Culture 08/23/2024 No Growth At 24 Hours   Preliminary    Troponin-I 08/23/2024 0.026  0.000 - 0.045 ng/mL Final    Bacteria, UA 08/23/2024 Rare  None Seen, Rare, Occasional /HPF Final    RBC, UA 08/23/2024 0-2  None Seen, 0-2, 3-5, 0-5 /HPF Final    WBC, UA 08/23/2024 0-2  None Seen, 0-2, 3-5, 0-5 /HPF Final    Squamous Epithelial Cells, UA 08/23/2024 Few (A)  None Seen, Rare, Occasional, Occ /HPF Final    Respiratory Culture 08/24/2024 Rare normal respiratory raymond   Preliminary    GRAM STAIN 08/24/2024 Quality 1+   Preliminary    GRAM STAIN 08/24/2024 Moderate Gram Negative Rods   Preliminary    GRAM STAIN 08/24/2024 Moderate Gram positive cocci   Preliminary    GRAM STAIN 08/24/2024 Few Gram Positive Rods   Preliminary    Sodium 08/24/2024 130 (L)  136 - 145  mmol/L Final    Potassium 08/24/2024 3.8  3.5 - 5.1 mmol/L Final    Chloride 08/24/2024 103  98 - 107 mmol/L Final    CO2 08/24/2024 19 (L)  23 - 31 mmol/L Final    Glucose 08/24/2024 126 (H)  82 - 115 mg/dL Final    Blood Urea Nitrogen 08/24/2024 8.7  8.4 - 25.7 mg/dL Final    Creatinine 08/24/2024 0.87  0.73 - 1.18 mg/dL Final    BUN/Creatinine Ratio 08/24/2024 10   Final    Calcium 08/24/2024 7.6 (L)  8.8 - 10.0 mg/dL Final    Anion Gap 08/24/2024 8.0  mEq/L Final    eGFR 08/24/2024 >60  mL/min/1.73/m2 Final    WBC 08/24/2024 7.28  4.50 - 11.50 x10(3)/mcL Final    RBC 08/24/2024 4.89  4.70 - 6.10 x10(6)/mcL Final    Hgb 08/24/2024 13.7 (L)  14.0 - 18.0 g/dL Final    Hct 08/24/2024 39.4 (L)  42.0 - 52.0 % Final    MCV 08/24/2024 80.6  80.0 - 94.0 fL Final    MCH 08/24/2024 28.0  27.0 - 31.0 pg Final    MCHC 08/24/2024 34.8  33.0 - 36.0 g/dL Final    RDW 08/24/2024 15.5  11.5 - 17.0 % Final    Platelet 08/24/2024 86 (L)  130 - 400 x10(3)/mcL Final    MPV 08/24/2024 9.9  7.4 - 10.4 fL Final    IPF 08/24/2024 9.4  0.9 - 11.2 % Final    Neut % 08/24/2024 77.6  % Final    Lymph % 08/24/2024 7.1  % Final    Mono % 08/24/2024 14.1  % Final    Eos % 08/24/2024 0.5  % Final    Basophil % 08/24/2024 0.3  % Final    Lymph # 08/24/2024 0.52 (L)  0.6 - 4.6 x10(3)/mcL Final    Neut # 08/24/2024 5.64  2.1 - 9.2 x10(3)/mcL Final    Mono # 08/24/2024 1.03  0.1 - 1.3 x10(3)/mcL Final    Eos # 08/24/2024 0.04  0 - 0.9 x10(3)/mcL Final    Baso # 08/24/2024 0.02  <=0.2 x10(3)/mcL Final    IG# 08/24/2024 0.03  0 - 0.04 x10(3)/mcL Final    IG% 08/24/2024 0.4  % Final    NRBC% 08/24/2024 0.0  % Final    Sodium 08/25/2024 130 (L)  136 - 145 mmol/L Final    Potassium 08/25/2024 4.3  3.5 - 5.1 mmol/L Final    Chloride 08/25/2024 101  98 - 107 mmol/L Final    CO2 08/25/2024 21 (L)  23 - 31 mmol/L Final    Glucose 08/25/2024 117 (H)  82 - 115 mg/dL Final    Blood Urea Nitrogen 08/25/2024 9.9  8.4 - 25.7 mg/dL Final    Creatinine 08/25/2024  0.89  0.73 - 1.18 mg/dL Final    BUN/Creatinine Ratio 08/25/2024 11   Final    Calcium 08/25/2024 8.7 (L)  8.8 - 10.0 mg/dL Final    Anion Gap 08/25/2024 8.0  mEq/L Final    eGFR 08/25/2024 >60  mL/min/1.73/m2 Final    WBC 08/25/2024 6.54  4.50 - 11.50 x10(3)/mcL Final    RBC 08/25/2024 4.93  4.70 - 6.10 x10(6)/mcL Final    Hgb 08/25/2024 13.8 (L)  14.0 - 18.0 g/dL Final    Hct 08/25/2024 40.2 (L)  42.0 - 52.0 % Final    MCV 08/25/2024 81.5  80.0 - 94.0 fL Final    MCH 08/25/2024 28.0  27.0 - 31.0 pg Final    MCHC 08/25/2024 34.3  33.0 - 36.0 g/dL Final    RDW 08/25/2024 15.7  11.5 - 17.0 % Final    Platelet 08/25/2024 97 (L)  130 - 400 x10(3)/mcL Final    MPV 08/25/2024 10.0  7.4 - 10.4 fL Final    IPF 08/25/2024 10.3  0.9 - 11.2 % Final    Neut % 08/25/2024 69.1  % Final    Lymph % 08/25/2024 11.3  % Final    Mono % 08/25/2024 17.4  % Final    Eos % 08/25/2024 1.1  % Final    Basophil % 08/25/2024 0.2  % Final    Lymph # 08/25/2024 0.74  0.6 - 4.6 x10(3)/mcL Final    Neut # 08/25/2024 4.52  2.1 - 9.2 x10(3)/mcL Final    Mono # 08/25/2024 1.14  0.1 - 1.3 x10(3)/mcL Final    Eos # 08/25/2024 0.07  0 - 0.9 x10(3)/mcL Final    Baso # 08/25/2024 0.01  <=0.2 x10(3)/mcL Final    IG# 08/25/2024 0.06 (H)  0 - 0.04 x10(3)/mcL Final    IG% 08/25/2024 0.9  % Final    NRBC% 08/25/2024 0.0  % Final        X-Ray Chest 1 View    Result Date: 8/23/2024  EXAMINATION: XR CHEST 1 VIEW CLINICAL HISTORY: fever; TECHNIQUE: One view COMPARISON: November 4, 2021.. FINDINGS: Cardiopericardial silhouette is within normal limits.  Left lower pleuroparenchymal scarring with some calcifications was also present previous exam of 2021.  There are subtle new associated left lower lung opacity which are suspected to represent some new infiltrates.  Lungs otherwise are well expanded.  Clear.  There is no pulmonary edema.  Sternotomy changes.     Suspected mild infiltrates superimposed on left lower pleuroparenchymal scarring. Electronically signed  by: Atif Alberto Date:    08/23/2024 Time:    18:48      Microbiology Results (last 7 days)       Procedure Component Value Units Date/Time    Respiratory Culture [[id number]] Collected: 08/24/24 0638    Order Status: Completed Specimen: Sputum, Expectorated Updated: 08/25/24 0714     Respiratory Culture Rare normal respiratory raymond     GRAM STAIN Quality 1+      Moderate Gram Negative Rods      Moderate Gram positive cocci      Few Gram Positive Rods    Blood Culture [[id number]]  (Normal) Collected: 08/23/24 1904    Order Status: Completed Specimen: Blood from Forearm, Right Updated: 08/24/24 2300     Blood Culture No Growth At 24 Hours    Blood Culture [[id number]]  (Normal) Collected: 08/23/24 1858    Order Status: Completed Specimen: Blood from Antecubital, Right Updated: 08/24/24 2300     Blood Culture No Growth At 24 Hours                Medication List        START taking these medications      acetaminophen 325 MG tablet  Commonly known as: TYLENOL  Take 2 tablets (650 mg total) by mouth every 4 (four) hours as needed for Pain.     amoxicillin-clavulanate 875-125mg 875-125 mg per tablet  Commonly known as: AUGMENTIN  Take 1 tablet by mouth 2 (two) times daily. for 5 days     azithromycin 250 MG tablet  Commonly known as: Z-LAN  Take 2 tablets by mouth on day 1; Take 1 tablet by mouth on days 2-5            CONTINUE taking these medications      acetaZOLAMIDE 500 mg Cpsr  Commonly known as: DIAMOX     aspirin 81 MG EC tablet  Commonly known as: ECOTRIN     atorvastatin 40 MG tablet  Commonly known as: LIPITOR     clopidogreL 75 mg tablet  Commonly known as: PLAVIX  Take 1 tablet (75 mg total) by mouth once daily.     COMBIGAN 0.2-0.5 % Drop  Generic drug: brimonidine-timoloL     finasteride 5 mg tablet  Commonly known as: PROSCAR     fluticasone propionate 50 mcg/actuation nasal spray  Commonly known as: FLONASE  1 spray (50 mcg total) by Each Nostril route 2 (two) times daily as needed for Rhinitis or  Allergies.     isosorbide dinitrate 5 MG Tab  Commonly known as: ISORDIL     meclizine 12.5 mg tablet  Commonly known as: ANTIVERT  Take 1 tablet (12.5 mg total) by mouth 3 (three) times daily as needed for Dizziness.     metoprolol tartrate 25 MG tablet  Commonly known as: LOPRESSOR     ranolazine 500 MG Tb12  Commonly known as: RANEXA     sildenafiL 100 MG tablet  Commonly known as: VIAGRA     traMADoL 50 mg tablet  Commonly known as: ULTRAM     VYZULTA 0.024 % Drop  Generic drug: latanoprostene bunod            STOP taking these medications      sulfamethoxazole-trimethoprim 800-160mg 800-160 mg Tab  Commonly known as: BACTRIM DS               Where to Get Your Medications        These medications were sent to [facility]/pharmacy #[id number] - [address] [address]      Phone: [phone]   amoxicillin-clavulanate 875-125mg 875-125 mg per tablet  azithromycin 250 MG tablet       You can get these medications from any pharmacy    You don't need a prescription for these medications  acetaminophen 325 MG tablet         Explained in detail to the patient about the discharge plan, medications, and follow-up visits. Pt understands and agrees with the treatment plan  Discharged Condition: stable  Medications Per DC med rec  Activities as tolerated  Follow-up pcp 1 week  For further questions contact hospitalist office    Discharge time 30 minutes    For worsening symptoms, chest pain, shortness of breath, increased abdominal pain, high grade fever, stroke or stroke like symptoms, immediately go to the nearest Emergency Room or call 911 as soon as possible.      Fabian Peterson M.D, on 8/25/2024. at 11:37 AM.

## 2024-08-26 ENCOUNTER — PATIENT OUTREACH (OUTPATIENT)
Dept: ADMINISTRATIVE | Facility: CLINIC | Age: 79
End: 2024-08-26
Payer: COMMERCIAL

## 2024-08-26 LAB
BACTERIA SPEC CULT: NORMAL
GRAM STN SPEC: NORMAL

## 2024-08-26 NOTE — PROGRESS NOTES
C3 nurse spoke with Joanna Cancino  for a TCC post hospital discharge follow up call. The patient does not have a scheduled HOSFU appointment @ [facility]. Stated grand daughter will call office to schedule hospital follow up appointment.

## 2024-08-28 LAB
BACTERIA BLD CULT: NORMAL
BACTERIA BLD CULT: NORMAL

## 2024-09-25 ENCOUNTER — HOSPITAL ENCOUNTER (EMERGENCY)
Facility: HOSPITAL | Age: 79
Discharge: HOME | End: 2024-09-25
Attending: STUDENT IN AN ORGANIZED HEALTH CARE EDUCATION/TRAINING PROGRAM
Payer: COMMERCIAL

## 2024-09-25 VITALS
TEMPERATURE: 98 F | BODY MASS INDEX: 20.99 KG/M2 | RESPIRATION RATE: 18 BRPM | HEART RATE: 59 BPM | WEIGHT: 158.38 LBS | DIASTOLIC BLOOD PRESSURE: 89 MMHG | HEIGHT: 73 IN | SYSTOLIC BLOOD PRESSURE: 165 MMHG | OXYGEN SATURATION: 100 %

## 2024-09-25 DIAGNOSIS — N63.20 LEFT BREAST MASS: ICD-10-CM

## 2024-09-25 LAB
ALBUMIN SERPL-MCNC: 3.2 G/DL (ref 3.4–4.8)
ALBUMIN/GLOB SERPL: 0.9 RATIO (ref 1.1–2)
ALP SERPL-CCNC: 81 UNIT/L (ref 40–150)
ALT SERPL-CCNC: 16 UNIT/L (ref 0–55)
ANION GAP SERPL CALC-SCNC: 6 MEQ/L
AST SERPL-CCNC: 28 UNIT/L (ref 5–34)
BASOPHILS # BLD AUTO: 0.02 X10(3)/MCL
BASOPHILS NFR BLD AUTO: 0.4 %
BILIRUB SERPL-MCNC: 0.7 MG/DL
BUN SERPL-MCNC: 13.3 MG/DL (ref 8.4–25.7)
CALCIUM SERPL-MCNC: 9.1 MG/DL (ref 8.8–10)
CHLORIDE SERPL-SCNC: 108 MMOL/L (ref 98–107)
CO2 SERPL-SCNC: 22 MMOL/L (ref 23–31)
CREAT SERPL-MCNC: 0.93 MG/DL (ref 0.73–1.18)
CREAT/UREA NIT SERPL: 14
EOSINOPHIL # BLD AUTO: 0.13 X10(3)/MCL (ref 0–0.9)
EOSINOPHIL NFR BLD AUTO: 2.7 %
ERYTHROCYTE [DISTWIDTH] IN BLOOD BY AUTOMATED COUNT: 15.7 % (ref 11.5–17)
GFR SERPLBLD CREATININE-BSD FMLA CKD-EPI: >60 ML/MIN/1.73/M2
GLOBULIN SER-MCNC: 3.5 GM/DL (ref 2.4–3.5)
GLUCOSE SERPL-MCNC: 91 MG/DL (ref 82–115)
HCT VFR BLD AUTO: 38.9 % (ref 42–52)
HGB BLD-MCNC: 13 G/DL (ref 14–18)
HOLD SPECIMEN: NORMAL
IMM GRANULOCYTES # BLD AUTO: 0.02 X10(3)/MCL (ref 0–0.04)
IMM GRANULOCYTES NFR BLD AUTO: 0.4 %
LYMPHOCYTES # BLD AUTO: 1.45 X10(3)/MCL (ref 0.6–4.6)
LYMPHOCYTES NFR BLD AUTO: 29.7 %
MCH RBC QN AUTO: 27.8 PG (ref 27–31)
MCHC RBC AUTO-ENTMCNC: 33.4 G/DL (ref 33–36)
MCV RBC AUTO: 83.1 FL (ref 80–94)
MONOCYTES # BLD AUTO: 0.77 X10(3)/MCL (ref 0.1–1.3)
MONOCYTES NFR BLD AUTO: 15.7 %
NEUTROPHILS # BLD AUTO: 2.5 X10(3)/MCL (ref 2.1–9.2)
NEUTROPHILS NFR BLD AUTO: 51.1 %
NRBC BLD AUTO-RTO: 0 %
PLATELET # BLD AUTO: 172 X10(3)/MCL (ref 130–400)
PMV BLD AUTO: 11.7 FL (ref 7.4–10.4)
POTASSIUM SERPL-SCNC: 4.1 MMOL/L (ref 3.5–5.1)
PROT SERPL-MCNC: 6.7 GM/DL (ref 5.8–7.6)
RBC # BLD AUTO: 4.68 X10(6)/MCL (ref 4.7–6.1)
SODIUM SERPL-SCNC: 136 MMOL/L (ref 136–145)
WBC # BLD AUTO: 4.89 X10(3)/MCL (ref 4.5–11.5)

## 2024-09-25 PROCEDURE — 85025 COMPLETE CBC W/AUTO DIFF WBC: CPT | Performed by: NURSE PRACTITIONER

## 2024-09-25 PROCEDURE — 80053 COMPREHEN METABOLIC PANEL: CPT | Performed by: NURSE PRACTITIONER

## 2024-09-25 PROCEDURE — 99284 EMERGENCY DEPT VISIT MOD MDM: CPT | Mod: 25

## 2024-09-25 RX ORDER — HYDROCODONE BITARTRATE AND ACETAMINOPHEN 5; 325 MG/1; MG/1
1 TABLET ORAL EVERY 8 HOURS PRN
Qty: 12 TABLET | Refills: 0 | Status: SHIPPED | OUTPATIENT
Start: 2024-09-25

## 2024-09-25 NOTE — ED PROVIDER NOTES
"Encounter Date: 9/25/2024       History     Chief Complaint   Patient presents with    Mass     Patient reports lump on left side of chest for 2 months, tender when touched.      Pt is a 79 y.o. male who presents to the [facility] ED for evaluation of a "lump" in his Lt breast. Reports symptoms x 2 months. Seen for same issue at [facility] ED on 8/23 but has not followed up. Admits to "catching pneumonia" since that ED visit and wasn't feeling well. Denies chest pain, SOB, weakness, dizziness, fever, trauma to chest, change in breast texture, redness to breast, or nipple discharge. Reports mild tenderness to affected breast with palpation.      Review of patient's allergies indicates:  No Known Allergies  Past Medical History:   Diagnosis Date    HLD (hyperlipidemia)     HTN (hypertension)     PAD (peripheral artery disease)     Past heart attack      Past Surgical History:   Procedure Laterality Date    CORONARY ANGIOPLASTY WITH STENT PLACEMENT      HERNIA REPAIR      PERIPHERAL ANGIOGRAPHY N/A 4/10/2024    Procedure: Peripheral angiography;  Surgeon: Alex Crawford MD;  Location: [facility] CATH LAB;  Service: Cardiology;  Laterality: N/A;  BIRGIT ANGIO *WILL NEED GORE & SHOCKWAVE*    PROSTATE SURGERY       No family history on file.  Social History     Tobacco Use    Smoking status: Former     Types: Cigarettes    Smokeless tobacco: Never   Substance Use Topics    Alcohol use: Yes     Comment: daily     Review of Systems   Constitutional:  Negative for chills, diaphoresis, fatigue and fever.   HENT:  Negative for facial swelling, postnasal drip, rhinorrhea, sinus pressure, sinus pain, sore throat and trouble swallowing.    Respiratory:  Negative for cough, chest tightness, shortness of breath and wheezing.         Lt breast mass   Cardiovascular:  Negative for chest pain, palpitations and leg swelling.   Gastrointestinal:  Negative for abdominal pain, diarrhea, nausea and vomiting.   Genitourinary:  Negative for dysuria, flank " pain, hematuria and urgency.   Musculoskeletal:  Negative for arthralgias, back pain and myalgias.   Skin:  Negative for color change and rash.   Neurological:  Negative for dizziness, syncope, weakness and headaches.   Hematological:  Does not bruise/bleed easily.   All other systems reviewed and are negative.      Physical Exam     Initial Vitals [09/25/24 1452]   BP Pulse Resp Temp SpO2   129/78 75 18 98 °F (36.7 °C) 97 %      MAP       --         Physical Exam    Nursing note and vitals reviewed.  Constitutional: Vital signs are normal. He appears well-developed and well-nourished.   HENT:   Head: Normocephalic.   Nose: Nose normal.   Mouth/Throat: Oropharynx is clear and moist.   Eyes: Conjunctivae and EOM are normal. Pupils are equal, round, and reactive to light.   Neck: Neck supple.   Normal range of motion.  Cardiovascular:  Normal rate, regular rhythm, normal heart sounds and intact distal pulses.           Pulmonary/Chest: Effort normal and breath sounds normal. No respiratory distress. He has no wheezes. He has no rhonchi. He has no rales. He exhibits no tenderness.   Left breast exhibits mass and tenderness. Left breast exhibits no nipple discharge and no skin change.   Abdominal: Abdomen is soft and flat. Bowel sounds are normal. There is no abdominal tenderness. There is no rebound, no guarding, no tenderness at McBurney's point and negative Jasmine's sign.   Musculoskeletal:         General: Normal range of motion.      Cervical back: Normal range of motion and neck supple.     Neurological: He is alert and oriented to person, place, and time. He has normal strength.   Skin: Skin is warm and dry. Capillary refill takes less than 2 seconds.   Psychiatric: He has a normal mood and affect. His behavior is normal. Judgment and thought content normal.         ED Course   Procedures  Labs Reviewed   COMPREHENSIVE METABOLIC PANEL - Abnormal       Result Value    Sodium 136      Potassium 4.1      Chloride  108 (*)     CO2 22 (*)     Glucose 91      Blood Urea Nitrogen 13.3      Creatinine 0.93      Calcium 9.1      Protein Total 6.7      Albumin 3.2 (*)     Globulin 3.5      Albumin/Globulin Ratio 0.9 (*)     Bilirubin Total 0.7      ALP 81      ALT 16      AST 28      eGFR >60      Anion Gap 6.0      BUN/Creatinine Ratio 14     CBC WITH DIFFERENTIAL - Abnormal    WBC 4.89      RBC 4.68 (*)     Hgb 13.0 (*)     Hct 38.9 (*)     MCV 83.1      MCH 27.8      MCHC 33.4      RDW 15.7      Platelet 172      MPV 11.7 (*)     Neut % 51.1      Lymph % 29.7      Mono % 15.7      Eos % 2.7      Basophil % 0.4      Lymph # 1.45      Neut # 2.50      Mono # 0.77      Eos # 0.13      Baso # 0.02      IG# 0.02      IG% 0.4      NRBC% 0.0     CBC W/ AUTO DIFFERENTIAL    Narrative:     The following orders were created for panel order CBC auto differential.  Procedure                               Abnormality         Status                     ---------                               -----------         ------                     CBC with Differential[[id number]]       Abnormal            Final result                 Please view results for these tests on the individual orders.   EXTRA TUBES    Narrative:     The following orders were created for panel order EXTRA TUBES.  Procedure                               Abnormality         Status                     ---------                               -----------         ------                     Light Blue Top Hold[[id number]]                             In process                 Light Green Top Hold[[id number]]                            In process                 Lavender Top Hold[[id number]]                               In process                 Gold Top Hold[[id number]]                                   In process                 Pink Top Hold[[id number]]                                   In process                   Please view results for these tests on the individual orders.    LIGHT BLUE TOP HOLD   LIGHT GREEN TOP HOLD   LAVENDER TOP HOLD   GOLD TOP HOLD   PINK TOP HOLD          Imaging Results              X-Ray Chest PA And Lateral (Final result)  Result time 09/25/24 16:23:25      Final result by Liam Kirk MD (09/25/24 16:23:25)                   Impression:      Calcified pleural plaques at the left base.    Otherwise no active pulmonary disease      Electronically signed by: Liam Kirk  Date:    09/25/2024  Time:    16:23               Narrative:    EXAMINATION:  XR CHEST PA AND LATERAL    CLINICAL HISTORY:  , Unspecified lump in the left breast, unspecified quadrant.    FINDINGS:  Examination reveals mediastinal silhouette to be within normal limits cardiac silhouette is not enlarged calcified pleural plaques and scarring identified at the left base with similar changes compared with previous exams dating back to 2018 and 2021.  There is minimal blunting of the left costophrenic angle chronic in nature and reflecting pleural thickening.    No focal consolidative changes atelectases effusions or pneumothoraces                                       Medications - No data to display  Medical Decision Making  Differential:  Breast mass  Infection  Abscess      Amount and/or Complexity of Data Reviewed  Labs: ordered.  Radiology: ordered.               ED Course as of 09/25/24 1657   Wed Sep 25, 2024   1650 Given strict ED return precautions. I have spoken with the patient and/or caregivers. I have explained the patient's condition, diagnoses and treatment plan based on the information available to me at this time. I have answered the patient's and/or caregiver's questions and addressed any concerns. The patient and/or caregivers have as good an understanding of the patient's diagnosis, condition and treatment plan as can be expected at this point. The vital signs have been stable. The patient's condition is stable and appropriate for discharge from the emergency  department.      The patient will pursue further outpatient evaluation with the primary care physician or other designated or consulting physician as outlined in the discharge instructions. The patient and/or caregivers are agreeable to this plan of care and follow-up instructions have been explained in detail. The patient and/or caregivers have received these instructions in written format and have expressed an understanding of the discharge instructions. The patient and/or caregivers are aware that any significant change in condition or worsening of symptoms should prompt an immediate return to this or the closest emergency department or a call to 911.   [JA]      ED Course User Index  [JA] Jamel Tobar Jr., FNP                           Clinical Impression:  Final diagnoses:  [N63.20] Left breast mass          ED Disposition Condition    Discharge Stable          ED Prescriptions       Medication Sig Dispense Start Date End Date Auth. Provider    HYDROcodone-acetaminophen (NORCO) 5-325 mg per tablet Take 1 tablet by mouth every 8 (eight) hours as needed for Pain. 12 tablet 9/25/2024 -- Jamel Tobar Jr., FNP          Follow-up Information       Follow up With Specialties Details Why Contact [facility]    [facility] -  In 3 days  [address] [facility] [address] [phone]      [facility] - Emergency Dept Emergency Medicine In 3 days As needed, If symptoms worsen [address] [facility] [address] [phone]             Jamel Tobar Jr., FNP  09/25/24 6460

## 2024-09-25 NOTE — DISCHARGE INSTRUCTIONS
Follow up with [facility] as discussed to obtain a PCP.  Apply warm compress to affected breast twice daily.  Have outpatient US of breast as planned.  Follow up with the [facility] as discussed.

## 2024-09-30 DIAGNOSIS — N63.25 UNSPECIFIED LUMP IN THE LEFT BREAST, OVERLAPPING QUADRANTS: ICD-10-CM

## 2024-09-30 DIAGNOSIS — N63.20 MASS OF LEFT BREAST, UNSPECIFIED QUADRANT: Primary | ICD-10-CM

## 2024-10-22 ENCOUNTER — HOSPITAL ENCOUNTER (OUTPATIENT)
Dept: RADIOLOGY | Facility: HOSPITAL | Age: 79
Discharge: HOME | End: 2024-10-22
Attending: NURSE PRACTITIONER
Payer: COMMERCIAL

## 2024-10-22 ENCOUNTER — HOSPITAL ENCOUNTER (OUTPATIENT)
Dept: RADIOLOGY | Facility: HOSPITAL | Age: 79
Discharge: HOME | End: 2024-10-22
Attending: PHYSICIAN ASSISTANT
Payer: COMMERCIAL

## 2024-10-22 DIAGNOSIS — N63.20 LEFT BREAST MASS: ICD-10-CM

## 2024-10-22 DIAGNOSIS — N63.25 UNSPECIFIED LUMP IN THE LEFT BREAST, OVERLAPPING QUADRANTS: ICD-10-CM

## 2024-10-22 DIAGNOSIS — N63.20 MASS OF LEFT BREAST, UNSPECIFIED QUADRANT: ICD-10-CM

## 2024-10-22 PROCEDURE — 77066 DX MAMMO INCL CAD BI: CPT | Mod: TC

## 2024-10-22 PROCEDURE — 77062 BREAST TOMOSYNTHESIS BI: CPT | Mod: TC

## 2024-10-22 PROCEDURE — 76641 ULTRASOUND BREAST COMPLETE: CPT | Mod: 26,LT,, | Performed by: STUDENT IN AN ORGANIZED HEALTH CARE EDUCATION/TRAINING PROGRAM

## 2024-10-22 PROCEDURE — 76641 ULTRASOUND BREAST COMPLETE: CPT | Mod: TC,LT

## 2024-11-07 ENCOUNTER — OFFICE VISIT (OUTPATIENT)
Dept: INTERNAL MEDICINE | Facility: CLINIC | Age: 79
End: 2024-11-07
Payer: COMMERCIAL

## 2024-11-07 VITALS
OXYGEN SATURATION: 98 % | SYSTOLIC BLOOD PRESSURE: 117 MMHG | DIASTOLIC BLOOD PRESSURE: 72 MMHG | TEMPERATURE: 99 F | BODY MASS INDEX: 21.16 KG/M2 | WEIGHT: 159.63 LBS | HEIGHT: 73 IN | RESPIRATION RATE: 16 BRPM | HEART RATE: 70 BPM

## 2024-11-07 DIAGNOSIS — Z79.899 OTHER LONG TERM (CURRENT) DRUG THERAPY: ICD-10-CM

## 2024-11-07 DIAGNOSIS — Z12.5 ENCOUNTER FOR SCREENING FOR MALIGNANT NEOPLASM OF PROSTATE: ICD-10-CM

## 2024-11-07 DIAGNOSIS — Z76.89 ENCOUNTER TO ESTABLISH CARE: Primary | ICD-10-CM

## 2024-11-07 DIAGNOSIS — N62 GYNECOMASTIA: ICD-10-CM

## 2024-11-07 DIAGNOSIS — N63.20 LEFT BREAST MASS: ICD-10-CM

## 2024-11-07 DIAGNOSIS — R79.9 ABNORMAL FINDING OF BLOOD CHEMISTRY, UNSPECIFIED: ICD-10-CM

## 2024-11-07 DIAGNOSIS — Z79.818 LONG TERM (CURRENT) USE OF OTHER AGENTS AFFECTING ESTROGEN RECEPTORS AND ESTROGEN LEVELS: ICD-10-CM

## 2024-11-07 DIAGNOSIS — R20.9 UNSPECIFIED DISTURBANCES OF SKIN SENSATION: ICD-10-CM

## 2024-11-07 PROCEDURE — 99215 OFFICE O/P EST HI 40 MIN: CPT | Mod: PBBFAC

## 2024-11-07 RX ORDER — LEVOCETIRIZINE DIHYDROCHLORIDE 5 MG/1
1 TABLET, FILM COATED ORAL DAILY
COMMUNITY
Start: 2024-10-15

## 2024-11-07 RX ORDER — MULTIVITAMIN
1 TABLET ORAL DAILY
COMMUNITY

## 2024-11-07 RX ORDER — RANOLAZINE 500 MG/1
1 TABLET, EXTENDED RELEASE ORAL 2 TIMES DAILY
COMMUNITY

## 2024-11-07 NOTE — PROGRESS NOTES
INTERNAL MEDICINE RESIDENT CLINIC  CLINIC NOTE    Patient Name: Joanna Cancino  YOB: 1945    PRESENTING HISTORY       History of Present Illness:  Mr. Joanna Cancino is a 79 y.o. male w/ history of HLD, HTN, PAD, CAD with stents placed. Previously seen by TERESA.  Presenting to [facility].    2024: He has complaints of chest pain related to left breast mass noted to be gynecomastia, features appearing benign. Chart review demonstrates previous use of testosterone blocking shots. He is currently on finasteride. Otherwise follows with Urology. He has no complaints today.    CURRENT MEDICATIONS      Current Outpatient Medications on File Prior to Visit   Medication Sig    acetaminophen (TYLENOL) 325 MG tablet Take 2 tablets (650 mg total) by mouth every 4 (four) hours as needed for Pain. (Patient taking differently: Take 500 mg by mouth every 4 (four) hours as needed for Pain.)    aspirin (ECOTRIN) 81 MG EC tablet Take 81 mg by mouth once daily.    atorvastatin (LIPITOR) 40 MG tablet SMARTSI Tablet(s) By Mouth Every Evening    clopidogreL (PLAVIX) 75 mg tablet Take 1 tablet (75 mg total) by mouth once daily.    COMBIGAN 0.2-0.5 % Drop Place 1 drop into both eyes 2 (two) times daily.    finasteride (PROSCAR) 5 mg tablet Take 5 mg by mouth once daily.    fluticasone propionate (FLONASE) 50 mcg/actuation nasal spray 1 spray (50 mcg total) by Each Nostril route 2 (two) times daily as needed for Rhinitis or Allergies.    HYDROcodone-acetaminophen (NORCO) 5-325 mg per tablet Take 1 tablet by mouth every 8 (eight) hours as needed for Pain.    levocetirizine (XYZAL) 5 MG tablet Take 1 tablet by mouth once daily.    meclizine (ANTIVERT) 12.5 mg tablet Take 1 tablet (12.5 mg total) by mouth 3 (three) times daily as needed for Dizziness.    metoprolol tartrate (LOPRESSOR) 25 MG tablet Take 25 mg by mouth 2 (two) times daily.    multivitamin (THERAGRAN) per tablet Take 1 tablet by mouth once daily.     ranolazine (RANEXA) 500 MG Tb12 Take 1 tablet by mouth 2 (two) times daily.    sildenafiL (VIAGRA) 100 MG tablet Take 100 mg by mouth daily as needed.    VYZULTA 0.024 % Drop Place 1 drop into both eyes nightly. (Patient taking differently: Place 1 drop into the right eye nightly.)    acetaZOLAMIDE (DIAMOX) 500 mg CpSR Take 500 mg by mouth 2 (two) times daily. (Patient not taking: Reported on 11/7/2024)    isosorbide dinitrate (ISORDIL) 5 MG Tab Take 5 mg by mouth 2 (two) times daily. (Patient not taking: Reported on 11/7/2024)    [DISCONTINUED] ranolazine (RANEXA) 500 MG Tb12 Take 500 mg by mouth 2 (two) times daily.     No current facility-administered medications on file prior to visit.         Review of Systems   All other systems reviewed and are negative.      PAST HISTORY:     Past Medical History:   Diagnosis Date    HLD (hyperlipidemia)     HTN (hypertension)     PAD (peripheral artery disease)     Past heart attack        Past Surgical History:   Procedure Laterality Date    CORONARY ANGIOPLASTY WITH STENT PLACEMENT      HERNIA REPAIR      PERIPHERAL ANGIOGRAPHY N/A 4/10/2024    Procedure: Peripheral angiography;  Surgeon: Alex Crawford MD;  Location: [facility] CATH LAB;  Service: Cardiology;  Laterality: N/A;  BIRGIT ANGIO *WILL NEED GORE & SHOCKWAVE*    PROSTATE SURGERY         Family History   Problem Relation Name Age of Onset    Kidney disease Father      Heart disease Father         Social History     Socioeconomic History    Marital status: Single   Tobacco Use    Smoking status: Former     Types: Cigarettes    Smokeless tobacco: Never   Substance and Sexual Activity    Alcohol use: Yes     Alcohol/week: 2.0 standard drinks of alcohol     Types: 2 Cans of beer per week    Drug use: Never    Sexual activity: Not Currently     Social Drivers of Health     Financial Resource Strain: Low Risk  (8/25/2024)    Overall Financial Resource Strain (CARDIA)     Difficulty of Paying Living Expenses: Not hard at all  "  Food Insecurity: No Food Insecurity (8/25/2024)    Hunger Vital Sign     Worried About Running Out of Food in the Last Year: Never true     Ran Out of Food in the Last Year: Never true   Transportation Needs: No Transportation Needs (8/25/2024)    TRANSPORTATION NEEDS     Transportation : No   Stress: No Stress Concern Present (8/25/2024)    [employer] - Occupational Stress Questionnaire     Feeling of Stress : Not at all   Housing Stability: Low Risk  (8/25/2024)    Housing Stability Vital Sign     Unable to Pay for Housing in the Last Year: No     Homeless in the Last Year: No       Review of patient's allergies indicates:  No Known Allergies    OBJECTIVE:   Vital Signs:  Vitals:    11/07/24 1312   BP: 117/72   Pulse: 70   Resp: 16   Temp: 98.6 °F (37 °C)   TempSrc: Oral   SpO2: 98%   Weight: 72.4 kg (159 lb 9.6 oz)   Height: 6' 1" (1.854 m)       No results found for this or any previous visit (from the past 24 hours).      Physical Exam  Constitutional:       General: He is not in acute distress.     Appearance: Normal appearance.   HENT:      Head: Normocephalic.   Eyes:      Extraocular Movements: Extraocular movements intact.      Conjunctiva/sclera: Conjunctivae normal.      Pupils: Pupils are equal, round, and reactive to light.   Cardiovascular:      Rate and Rhythm: Normal rate and regular rhythm.      Pulses: Normal pulses.      Heart sounds: Normal heart sounds.   Pulmonary:      Effort: Pulmonary effort is normal.   Abdominal:      General: Abdomen is flat.   Musculoskeletal:         General: Normal range of motion.      Cervical back: Normal range of motion.   Skin:     General: Skin is warm.      Capillary Refill: Capillary refill takes less than 2 seconds.   Neurological:      General: No focal deficit present.      Mental Status: He is alert.   Psychiatric:         Mood and Affect: Mood normal.         Laboratory  CMP:   Recent Labs   Lab 01/11/24  0828 " "02/26/24  1504 08/23/24  1807 08/24/24  0707 08/25/24  0619 09/25/24  1542   Sodium 138   < > 129 L 130 L 130 L 136   Potassium 4.6   < > 3.9 3.8 4.3 4.1   CO2 29   < > 15 L 19 L 21 L 22 L   Blood Urea Nitrogen 12.2   < > 11.7 8.7 9.9 13.3   Creatinine 0.78   < > 1.11 0.87 0.89 0.93   Glucose 94   < > 139 H 126 H 117 H 91   Calcium 8.9   < > 8.5 L 7.6 L 8.7 L 9.1   Albumin 3.5  --  3.1 L  --   --  3.2 L   Bilirubin Total 0.7  --  0.5  --   --  0.7   AST 41 H  --  38 H  --   --  28   ALT 33  --  20  --   --  16   ALP 68  --  62  --   --  81    < > = values in this interval not displayed.     CBC:   Recent Labs   Lab 08/24/24  0707 08/25/24  0619 09/25/24  1542   WBC 7.28 6.54 4.89   Neut # 5.64 4.52 2.50   RBC 4.89 4.93 4.68 L   Hgb 13.7 L 13.8 L 13.0 L   Hct 39.4 L 40.2 L 38.9 L   Platelet 86 L 97 L 172   MCV 80.6 81.5 83.1   RDW 15.5 15.7 15.7     FLP:   Recent Labs   Lab 01/11/24  0828   Cholesterol Total 134   HDL Cholesterol 58   LDL Cholesterol 66.00   Triglyceride 51     DM:   Recent Labs   Lab 08/24/24  0707 08/25/24  0619 09/25/24  1542   Creatinine 0.87 0.89 0.93     Thyroid:       Invalid input(s): "[id number]"  Anemia:   Recent Labs   Lab 09/25/24  1542   Hgb 13.0 L   Hct 38.9 L           ASSESSMENT & PLAN:   Encounter to establish care  - Labs Below    Gynecomastia  - Likely 2/2 to testosterone blocking agents previously noted 2 years ago  - Will obtain testosterone (advised morning obtaining), prolactin    HTN  Hx of PAD, CAD (2018 cath)  - Follows with [facility]  - Continue current medications (DAPT, Metoprolol 25mg, Ranolazine)  - BP today wnl    Prostate CA  - Follows with Urology  - Taking Finasteride    RTC 6 months, labs in AM at his convenience, DXA scan    Joanna Lopez" was seen today for pain.    Diagnoses and all orders for this visit:    Encounter to establish care  -     Hemoglobin A1C; Future  -     Iron and TIBC; Future  -     Ferritin; Future  -     Vitamin D; Future  -     Vitamin B12; " Future  -     Folate; Future  -     HIV 1/2 Ag/Ab (4th Gen); Future  -     Hepatitis C Antibody; Future  -     Hepatitis B Core Antibody, Total; Future  -     Hepatitis B surface antigen; Future  -     Hepatitis B Surface Ab, Qualitative; Future    Left breast mass  -     Ambulatory referral/consult to Internal Medicine    Gynecomastia  -     DXA Bone Density Axial Skeleton 1 or more sites; Future  -     PSA, Screening; Future  -     Testosterone; Future  -     Prolactin; Future    Abnormal finding of blood chemistry, unspecified  -     Hemoglobin A1C; Future  -     Iron and TIBC; Future  -     Ferritin; Future    Other long term (current) drug therapy  -     Vitamin D; Future    Unspecified disturbances of skin sensation  -     Vitamin B12; Future  -     Folate; Future    Long term (current) use of other agents affecting estrogen receptors and estrogen levels  -     DXA Bone Density Axial Skeleton 1 or more sites; Future    Encounter for screening for malignant neoplasm of prostate  -     PSA, Screening; Future        Health Maintenance Due   Topic Date Due    Hepatitis C Screening  Never done    Pneumococcal Vaccines (Age 65+) (1 of 2 - PCV) Never done    TETANUS VACCINE  Never done    Shingles Vaccine (1 of 2) Never done    RSV Vaccine (Age 60+ and Pregnant patients) (1 - 1-dose 75+ series) Never done    Influenza Vaccine (1) Never done    COVID-19 Vaccine (3 - 2024-25 season) 09/01/2024           Health Maintenance/ Wellness  Pneumococcal vaccine: Deferred   TDAP: Deferred  Influenza vaccine: Deferred  Shingrix vaccine: Deferred  Screening colonoscopy: [place] Decision Making  Mammogram: Following with Radiology regarding bilateral gynecomastia  Lung Cancer Screening: Non-smoker  Hepatitis Panel: Ordered  HIV - Ordered    Future Appointments     Future Appointments   Date Time Provider Department Center   12/5/2024  1:00 PM PROVIDER, [facility] BREAST SURGERY [facility] JOHN Ghosh      Orders Placed This Encounter    Procedures    DXA Bone Density Axial Skeleton 1 or more sites     Standing Status:   Future     Standing Expiration Date:   11/7/2027     Order Specific Question:   May the Radiologist modify the order per protocol to meet the clinical needs of the patient?     Answer:   Yes     Order Specific Question:   Release to patient     Answer:   Immediate    Hemoglobin A1C     Standing Status:   Future     Standing Expiration Date:   1/6/2026    Iron and TIBC     Standing Status:   Future     Standing Expiration Date:   1/6/2026    Ferritin     Standing Status:   Future     Standing Expiration Date:   1/6/2026    Vitamin D     Standing Status:   Future     Standing Expiration Date:   1/6/2026    Vitamin B12     Standing Status:   Future     Standing Expiration Date:   1/6/2026    Folate     Standing Status:   Future     Standing Expiration Date:   1/6/2026    HIV 1/2 Ag/Ab (4th Gen)     Standing Status:   Future     Standing Expiration Date:   1/6/2026     Order Specific Question:   Release to patient     Answer:   Immediate    Hepatitis C Antibody     Standing Status:   Future     Standing Expiration Date:   2/5/2026     Order Specific Question:   Release to patient     Answer:   Immediate    Hepatitis B Core Antibody, Total     Standing Status:   Future     Standing Expiration Date:   2/5/2026    Hepatitis B surface antigen     Standing Status:   Future     Standing Expiration Date:   2/5/2026    Hepatitis B Surface Ab, Qualitative     Standing Status:   Future     Standing Expiration Date:   2/5/2026    PSA, Screening     Standing Status:   Future     Standing Expiration Date:   5/7/2025    Testosterone     Standing Status:   Future     Standing Expiration Date:   2/5/2026    Prolactin     Standing Status:   Future     Standing Expiration Date:   2/5/2026         Discussed with Dr. Saavedra  - Staff Attestation to Follow    Yon Jameson MD  [facility] INTERNAL MEDICINE PGY-3  11/07/2024 1:15 PM  [facility]   [place]

## 2024-11-08 ENCOUNTER — LAB VISIT (OUTPATIENT)
Dept: LAB | Facility: HOSPITAL | Age: 79
End: 2024-11-08
Payer: COMMERCIAL

## 2024-11-08 DIAGNOSIS — Z12.5 ENCOUNTER FOR SCREENING FOR MALIGNANT NEOPLASM OF PROSTATE: ICD-10-CM

## 2024-11-08 DIAGNOSIS — R79.9 ABNORMAL FINDING OF BLOOD CHEMISTRY, UNSPECIFIED: ICD-10-CM

## 2024-11-08 DIAGNOSIS — R76.8 HEPATITIS C ANTIBODY POSITIVE IN BLOOD: Primary | ICD-10-CM

## 2024-11-08 DIAGNOSIS — R20.9 UNSPECIFIED DISTURBANCES OF SKIN SENSATION: ICD-10-CM

## 2024-11-08 DIAGNOSIS — Z76.89 ENCOUNTER TO ESTABLISH CARE: ICD-10-CM

## 2024-11-08 DIAGNOSIS — Z79.899 OTHER LONG TERM (CURRENT) DRUG THERAPY: ICD-10-CM

## 2024-11-08 DIAGNOSIS — N62 GYNECOMASTIA: ICD-10-CM

## 2024-11-08 LAB
25(OH)D3+25(OH)D2 SERPL-MCNC: 26 NG/ML (ref 30–80)
EST. AVERAGE GLUCOSE BLD GHB EST-MCNC: 102.5 MG/DL
FERRITIN SERPL-MCNC: 189.03 NG/ML (ref 21.81–274.66)
FOLATE SERPL-MCNC: 17.1 NG/ML (ref 7–31.4)
HBA1C MFR BLD: 5.2 %
HBV CORE AB SERPL QL IA: NONREACTIVE
HBV SURFACE AB SER-ACNC: 0.11 MIU/ML
HBV SURFACE AB SERPL IA-ACNC: NONREACTIVE M[IU]/ML
HBV SURFACE AG SERPL QL IA: NONREACTIVE
HCV AB SERPL QL IA: REACTIVE
HIV 1+2 AB+HIV1 P24 AG SERPL QL IA: NONREACTIVE
IRON SATN MFR SERPL: 34 % (ref 20–50)
IRON SERPL-MCNC: 85 UG/DL (ref 65–175)
PROLACTIN LEVEL (OLG): 16.6 NG/ML (ref 3.46–19.4)
PSA SERPL-MCNC: <0.1 NG/ML
TESTOST SERPL-MCNC: 461.8 NG/DL (ref 220.91–715.81)
TIBC SERPL-MCNC: 167 UG/DL (ref 69–240)
TIBC SERPL-MCNC: 252 UG/DL (ref 250–450)
TRANSFERRIN SERPL-MCNC: 219 MG/DL (ref 163–344)
VIT B12 SERPL-MCNC: 645 PG/ML (ref 213–816)

## 2024-11-08 PROCEDURE — 83036 HEMOGLOBIN GLYCOSYLATED A1C: CPT

## 2024-11-08 PROCEDURE — 86704 HEP B CORE ANTIBODY TOTAL: CPT

## 2024-11-08 PROCEDURE — 87340 HEPATITIS B SURFACE AG IA: CPT

## 2024-11-08 PROCEDURE — 84153 ASSAY OF PSA TOTAL: CPT

## 2024-11-08 PROCEDURE — 84146 ASSAY OF PROLACTIN: CPT

## 2024-11-08 PROCEDURE — 82746 ASSAY OF FOLIC ACID SERUM: CPT

## 2024-11-08 PROCEDURE — 83540 ASSAY OF IRON: CPT

## 2024-11-08 PROCEDURE — 86706 HEP B SURFACE ANTIBODY: CPT

## 2024-11-08 PROCEDURE — 82306 VITAMIN D 25 HYDROXY: CPT

## 2024-11-08 PROCEDURE — 86803 HEPATITIS C AB TEST: CPT

## 2024-11-08 PROCEDURE — 82728 ASSAY OF FERRITIN: CPT

## 2024-11-08 PROCEDURE — 84403 ASSAY OF TOTAL TESTOSTERONE: CPT

## 2024-11-08 PROCEDURE — 87389 HIV-1 AG W/HIV-1&-2 AB AG IA: CPT

## 2024-11-08 PROCEDURE — 36415 COLL VENOUS BLD VENIPUNCTURE: CPT

## 2024-11-08 PROCEDURE — 82607 VITAMIN B-12: CPT

## 2024-11-13 ENCOUNTER — TELEPHONE (OUTPATIENT)
Dept: INFECTIOUS DISEASES | Facility: CLINIC | Age: 79
End: 2024-11-13
Payer: COMMERCIAL

## 2024-11-13 DIAGNOSIS — B18.2 CHRONIC HEPATITIS C WITHOUT HEPATIC COMA: Primary | ICD-10-CM

## 2024-11-14 ENCOUNTER — HOSPITAL ENCOUNTER (OUTPATIENT)
Dept: RADIOLOGY | Facility: HOSPITAL | Age: 79
Discharge: HOME | End: 2024-11-14
Payer: COMMERCIAL

## 2024-11-14 DIAGNOSIS — N62 GYNECOMASTIA: ICD-10-CM

## 2024-11-14 DIAGNOSIS — Z79.818 LONG TERM (CURRENT) USE OF OTHER AGENTS AFFECTING ESTROGEN RECEPTORS AND ESTROGEN LEVELS: ICD-10-CM

## 2024-11-14 PROCEDURE — 77080 DXA BONE DENSITY AXIAL: CPT | Mod: TC

## 2024-11-18 ENCOUNTER — TELEPHONE (OUTPATIENT)
Dept: INFECTIOUS DISEASES | Facility: CLINIC | Age: 79
End: 2024-11-18
Payer: COMMERCIAL

## 2024-11-18 NOTE — TELEPHONE ENCOUNTER
----- Message from Susana sent at 11/13/2024  8:10 AM CST -----  12/10/2024 a 3pm : fibro scan  ----- Message -----  From: Esha Arenas LPN  Sent: 11/13/2024   7:51 AM CST  To: Susana Jones; Antoine Singletary      ----- Message -----  From: Joey Dunbar LPN  Sent: 11/12/2024  10:28 AM CST  To: LYUBOV Man pt is scheduled 12/9/2024 with Imani for a new pt appt for HCV. Please schedule fibroscan and place orders

## 2024-11-27 ENCOUNTER — HOSPITAL ENCOUNTER (OUTPATIENT)
Dept: RADIOLOGY | Facility: HOSPITAL | Age: 79
Discharge: HOME | End: 2024-11-27
Payer: COMMERCIAL

## 2024-11-27 DIAGNOSIS — R76.8 HEPATITIS C ANTIBODY POSITIVE IN BLOOD: ICD-10-CM

## 2024-11-27 PROCEDURE — 76705 ECHO EXAM OF ABDOMEN: CPT | Mod: TC

## 2024-12-05 ENCOUNTER — OFFICE VISIT (OUTPATIENT)
Dept: SURGERY | Facility: CLINIC | Age: 79
End: 2024-12-05
Payer: COMMERCIAL

## 2024-12-05 VITALS
HEIGHT: 73 IN | TEMPERATURE: 98 F | RESPIRATION RATE: 20 BRPM | WEIGHT: 167 LBS | DIASTOLIC BLOOD PRESSURE: 70 MMHG | SYSTOLIC BLOOD PRESSURE: 103 MMHG | HEART RATE: 68 BPM | OXYGEN SATURATION: 97 % | BODY MASS INDEX: 22.13 KG/M2

## 2024-12-05 DIAGNOSIS — N63.20 LEFT BREAST MASS: ICD-10-CM

## 2024-12-05 PROCEDURE — 99215 OFFICE O/P EST HI 40 MIN: CPT | Mod: PBBFAC

## 2024-12-05 NOTE — PROGRESS NOTES
Patient seen by Dr. JUD Lawton will return prn. Written and verbal discharge instructions given.

## 2024-12-05 NOTE — PROGRESS NOTES
[facility] General Surgery Clinic Note    HPI:   Charu Cancino is a 78 yo male with PMHx of HTN, HLD, PAD, CAD with stent placement, and prostate cancer. Two months ago, patient noticed a nodule on his left nipple that was painful, swollen, and tender to palpation. Patient had mammography and US of bilateral breasts that showed changes consistent with gynecomastia. Patient took testosterone blocking shots as part of prostate cancer treatment 2 years ago and currently takes finasteride. Patient reports swelling around left nipple has gone down. Denies skin changes, drainage of left nipple, and any palpable changes in right chest. Family history of breast cancer in his niece.    PMH:   Past Medical History:   Diagnosis Date    HLD (hyperlipidemia)     HTN (hypertension)     PAD (peripheral artery disease)     Past heart attack       Meds:   Current Outpatient Medications:     acetaminophen (TYLENOL) 325 MG tablet, Take 2 tablets (650 mg total) by mouth every 4 (four) hours as needed for Pain., Disp: , Rfl:     acetaZOLAMIDE (DIAMOX) 500 mg CpSR, Take 500 mg by mouth 2 (two) times daily., Disp: , Rfl:     aspirin (ECOTRIN) 81 MG EC tablet, Take 81 mg by mouth once daily., Disp: , Rfl:     atorvastatin (LIPITOR) 40 MG tablet, SMARTSI Tablet(s) By Mouth Every Evening, Disp: , Rfl:     clopidogreL (PLAVIX) 75 mg tablet, Take 1 tablet (75 mg total) by mouth once daily., Disp: 90 tablet, Rfl: 3    COMBIGAN 0.2-0.5 % Drop, Place 1 drop into both eyes 2 (two) times daily., Disp: , Rfl:     finasteride (PROSCAR) 5 mg tablet, Take 5 mg by mouth once daily., Disp: , Rfl:     fluticasone propionate (FLONASE) 50 mcg/actuation nasal spray, 1 spray (50 mcg total) by Each Nostril route 2 (two) times daily as needed for Rhinitis or Allergies., Disp: 16 g, Rfl: 0    HYDROcodone-acetaminophen (NORCO) 5-325 mg per tablet, Take 1 tablet by mouth every 8 (eight) hours as needed for Pain., Disp: 12 tablet, Rfl: 0    levocetirizine (XYZAL) 5  MG tablet, Take 1 tablet by mouth once daily., Disp: , Rfl:     meclizine (ANTIVERT) 12.5 mg tablet, Take 1 tablet (12.5 mg total) by mouth 3 (three) times daily as needed for Dizziness., Disp: 20 tablet, Rfl: 0    metoprolol tartrate (LOPRESSOR) 25 MG tablet, Take 25 mg by mouth 2 (two) times daily., Disp: , Rfl:     multivitamin (THERAGRAN) per tablet, Take 1 tablet by mouth once daily., Disp: , Rfl:     ranolazine (RANEXA) 500 MG Tb12, Take 1 tablet by mouth 2 (two) times daily., Disp: , Rfl:     sildenafiL (VIAGRA) 100 MG tablet, Take 100 mg by mouth daily as needed., Disp: , Rfl:     VYZULTA 0.024 % Drop, Place 1 drop into both eyes nightly., Disp: , Rfl:     isosorbide dinitrate (ISORDIL) 5 MG Tab, Take 5 mg by mouth 2 (two) times daily. (Patient not taking: Reported on 12/5/2024), Disp: , Rfl:   Allergies: Review of patient's allergies indicates:  No Known Allergies  Social History:   Social History     Tobacco Use    Smoking status: Former     Types: Cigarettes    Smokeless tobacco: Never   Substance Use Topics    Alcohol use: Yes     Alcohol/week: 2.0 standard drinks of alcohol     Types: 2 Cans of beer per week    Drug use: Never     Family History:   Family History   Problem Relation Name Age of Onset    Kidney disease Father      Heart disease Father       Surgical History:   Past Surgical History:   Procedure Laterality Date    CORONARY ANGIOPLASTY WITH STENT PLACEMENT      HERNIA REPAIR      PERIPHERAL ANGIOGRAPHY N/A 4/10/2024    Procedure: Peripheral angiography;  Surgeon: Alex Crawford MD;  Location: [facility] CATH LAB;  Service: Cardiology;  Laterality: N/A;  BIRGIT ANGIO *WILL NEED GORE & SHOCKWAVE*    PROSTATE SURGERY         Objective:    Vitals:  Vitals:    12/05/24 1332   BP: 103/70   Pulse: 68   Resp: 20   Temp: 97.9 °F (36.6 °C)        Physical Exam:  Gen: NAD  Neuro: awake, alert, answering questions appropriately  CV: RR  Resp: non-labored breathing on RA  Abd: soft, ND, NT  Ext: moves all 4  spontaneously and purposefully  Skin: warm, well perfused  Breast: Palpable nodule of left breast, no overlying skin changes or drainage      Imaging:  10/22/24   - MAMMO DIGITAL DIAGNOSTIC BILAT WITH CHERELLE   - US BREAST LEFT COMPLETE     MALE BILATERAL DIGITAL DIAGNOSTIC MAMMOGRAM 3D/2D WITH CAD AND COMPLETE LEFT ULTRASOUND: 10/22/2024  HISTORY: 79-year-old male presents with area of concern (palpable lump, tender to palpation) in his left breast subareolar region x1 month. He denies any symptoms in his right breast. Baseline mammogram.     His past medical history includes prostate cancer.       COMPARISONS: No prior exams were available for comparison.       TECHNIQUE: Digital mammography views were performed with tomosynthesis. Current study was evaluated with a Computer Aided Detection (CAD) system.  Color flow and real-time ultrasound of the left breast four quadrants, retroareolar, and axilla regions were performed.  Static and cine clip gray scale and color flow images of the real-time examination were reviewed.      BREAST COMPOSITION: The breasts are heterogeneously dense, which may obscure small masses.       FINDINGS:      Bilateral Diagnostic Mammogram:  There are flame-shaped focal asymmetries with concave margins and interspersed fat density in the subareolar regions of both breasts, compatible with benign male gynecomastia, moderate on the left and mild on the right.  The left breast gynecomastia correlates with the patient's area of concern.       No suspicious masses, calcifications, or other findings are seen in either breast.       Complete Left Breast Ultrasound:   Sonographic evaluation of the entire left breast (all 4 quadrants, subareolar, axilla) was performed, revealing no suspicious finding.     In the subareolar region, there is a fibroglandular tissue mound compatible with benign male gynecomastia and correlating with the patient's area of concern.  This is benign.       No suspicious  left axillary adenopathy.          IMPRESSION: BENIGN, ULTRASOUND BENIGN   1) No mammographic evidence of malignancy in either breast.  No sonographic evidence of malignancy in the left breast.     2) Bilateral benign male gynecomastia, moderate on the left and mild on the right.  The left breast gynecomastia correlates with the patient's area of concern and is benign.  BI-RADS 2: Benign.  Further management of any symptomatic or palpable concern should be determined clinically.        RECOMMENDATIONS:  The patient was counseled on the benign nature of gynecomastia. I explained that during the early phase (<1 year duration) of gynecomastia, the nodular (florid) pattern of male gynecomastia is the most common type and is commonly associated with the clinical symptoms of a painful retroareolar breast mass or enlargement. He was reassured that his mammogram and clinical symptoms are benign.      We had a discussion regarding the use of certain drugs for treating prostatomegaly (5-alpha-reductase inhibitors) and how they can sometimes cause gynecomastia.  The patient reports taking testosterone blocking shots, which he started about 2 years ago and took every 6 months for 3 injections total.  This is most likely the cause of the patient's bilateral male gynecomastia.  I encouraged him to talk with the doctor that prescribes him this medication regarding this side effect.     No imaging follow-up is warranted at this time.     Further management of any symptomatic or palpable concern should be determined clinically. Recommend clinical follow-up of the left breast area concern.  I also encouraged him to monitor his area of concern and return to the [facility] if he identifies any changes that are worrisome to him.        Assessment/Plan:  Charu Cancino is a 80 yo male with PMHx of HTN, HLD, PAD, CAD with stent placement, and prostate cancer (s/p surgery, radiation, and hormone therapy) who presents with 2 month  history of left breast mass with mammogram and US findings consistent with gynecomastia. Recent testosterone and prolactin labs within normal limits. Patient following urology for prostate history, still taking finasteride daily. Patient reports improvement in swelling of left breast, not interested in surgical management of gynecomastia.    - RTC as needed    Aníbal Cueva, MS3  [facility] [employer]    Above patient seen and evaluated independently. Agree with note. Edits made as necessary.    Phil Lawton MD  [facility]

## 2024-12-05 NOTE — PROGRESS NOTES
[facility] Clinic Note    HPI:   Charu Cancino is a 78 yo male with PMHx of HTN, HLD, PAD, CAD with stent placement, and prostate cancer. Two months ago, patient noticed a nodule on his left nipple that was painful, swollen, and tender to palpation. Patient had mammography and US of bilateral breasts that showed changes consistent with gynecomastia. Patient took testosterone blocking shots as part of prostate cancer treatment 2 years ago and currently takes finasteride. Patient reports swelling around left nipple has gone down. Denies skin changes, drainage of left nipple, and any palpable changes in right chest. Family history of breast cancer in his niece.    PMH:   Past Medical History:   Diagnosis Date    HLD (hyperlipidemia)     HTN (hypertension)     PAD (peripheral artery disease)     Past heart attack       Meds:   Current Outpatient Medications:     acetaminophen (TYLENOL) 325 MG tablet, Take 2 tablets (650 mg total) by mouth every 4 (four) hours as needed for Pain., Disp: , Rfl:     acetaZOLAMIDE (DIAMOX) 500 mg CpSR, Take 500 mg by mouth 2 (two) times daily., Disp: , Rfl:     aspirin (ECOTRIN) 81 MG EC tablet, Take 81 mg by mouth once daily., Disp: , Rfl:     atorvastatin (LIPITOR) 40 MG tablet, SMARTSI Tablet(s) By Mouth Every Evening, Disp: , Rfl:     clopidogreL (PLAVIX) 75 mg tablet, Take 1 tablet (75 mg total) by mouth once daily., Disp: 90 tablet, Rfl: 3    COMBIGAN 0.2-0.5 % Drop, Place 1 drop into both eyes 2 (two) times daily., Disp: , Rfl:     finasteride (PROSCAR) 5 mg tablet, Take 5 mg by mouth once daily., Disp: , Rfl:     fluticasone propionate (FLONASE) 50 mcg/actuation nasal spray, 1 spray (50 mcg total) by Each Nostril route 2 (two) times daily as needed for Rhinitis or Allergies., Disp: 16 g, Rfl: 0    HYDROcodone-acetaminophen (NORCO) 5-325 mg per tablet, Take 1 tablet by mouth every 8 (eight) hours as needed for Pain., Disp: 12 tablet, Rfl: 0    levocetirizine (XYZAL) 5  MG tablet, Take 1 tablet by mouth once daily., Disp: , Rfl:     meclizine (ANTIVERT) 12.5 mg tablet, Take 1 tablet (12.5 mg total) by mouth 3 (three) times daily as needed for Dizziness., Disp: 20 tablet, Rfl: 0    metoprolol tartrate (LOPRESSOR) 25 MG tablet, Take 25 mg by mouth 2 (two) times daily., Disp: , Rfl:     multivitamin (THERAGRAN) per tablet, Take 1 tablet by mouth once daily., Disp: , Rfl:     ranolazine (RANEXA) 500 MG Tb12, Take 1 tablet by mouth 2 (two) times daily., Disp: , Rfl:     sildenafiL (VIAGRA) 100 MG tablet, Take 100 mg by mouth daily as needed., Disp: , Rfl:     VYZULTA 0.024 % Drop, Place 1 drop into both eyes nightly., Disp: , Rfl:     isosorbide dinitrate (ISORDIL) 5 MG Tab, Take 5 mg by mouth 2 (two) times daily. (Patient not taking: Reported on 12/5/2024), Disp: , Rfl:   Allergies: Review of patient's allergies indicates:  No Known Allergies  Social History:   Social History     Tobacco Use    Smoking status: Former     Types: Cigarettes    Smokeless tobacco: Never   Substance Use Topics    Alcohol use: Yes     Alcohol/week: 2.0 standard drinks of alcohol     Types: 2 Cans of beer per week    Drug use: Never     Family History:   Family History   Problem Relation Name Age of Onset    Kidney disease Father      Heart disease Father       Surgical History:   Past Surgical History:   Procedure Laterality Date    CORONARY ANGIOPLASTY WITH STENT PLACEMENT      HERNIA REPAIR      PERIPHERAL ANGIOGRAPHY N/A 4/10/2024    Procedure: Peripheral angiography;  Surgeon: Alex Crawford MD;  Location: [facility] CATH LAB;  Service: Cardiology;  Laterality: N/A;  BIRGIT ANGIO *WILL NEED GORE & SHOCKWAVE*    PROSTATE SURGERY         Objective:    Vitals:  Vitals:    12/05/24 1332   BP: 103/70   Pulse: 68   Resp: 20   Temp: 97.9 °F (36.6 °C)        Physical Exam:  Gen: NAD  Neuro: awake, alert, answering questions appropriately  CV: RR  Resp: non-labored breathing on RA  Abd: soft, ND, NT  Ext: moves all 4  spontaneously and purposefully  Skin: warm, well perfused  Breast: Palpable nodule of left breast, no overlying skin changes or drainage      Imaging:  10/22/24   - MAMMO DIGITAL DIAGNOSTIC BILAT WITH CHERELLE   - US BREAST LEFT COMPLETE     MALE BILATERAL DIGITAL DIAGNOSTIC MAMMOGRAM 3D/2D WITH CAD AND COMPLETE LEFT ULTRASOUND: 10/22/2024  HISTORY: 79-year-old male presents with area of concern (palpable lump, tender to palpation) in his left breast subareolar region x1 month. He denies any symptoms in his right breast. Baseline mammogram.     His past medical history includes prostate cancer.       COMPARISONS: No prior exams were available for comparison.       TECHNIQUE: Digital mammography views were performed with tomosynthesis. Current study was evaluated with a Computer Aided Detection (CAD) system.  Color flow and real-time ultrasound of the left breast four quadrants, retroareolar, and axilla regions were performed.  Static and cine clip gray scale and color flow images of the real-time examination were reviewed.      BREAST COMPOSITION: The breasts are heterogeneously dense, which may obscure small masses.       FINDINGS:      Bilateral Diagnostic Mammogram:  There are flame-shaped focal asymmetries with concave margins and interspersed fat density in the subareolar regions of both breasts, compatible with benign male gynecomastia, moderate on the left and mild on the right.  The left breast gynecomastia correlates with the patient's area of concern.       No suspicious masses, calcifications, or other findings are seen in either breast.       Complete Left Breast Ultrasound:   Sonographic evaluation of the entire left breast (all 4 quadrants, subareolar, axilla) was performed, revealing no suspicious finding.     In the subareolar region, there is a fibroglandular tissue mound compatible with benign male gynecomastia and correlating with the patient's area of concern.  This is benign.       No suspicious  left axillary adenopathy.          IMPRESSION: BENIGN, ULTRASOUND BENIGN   1) No mammographic evidence of malignancy in either breast.  No sonographic evidence of malignancy in the left breast.     2) Bilateral benign male gynecomastia, moderate on the left and mild on the right.  The left breast gynecomastia correlates with the patient's area of concern and is benign.  BI-RADS 2: Benign.  Further management of any symptomatic or palpable concern should be determined clinically.        RECOMMENDATIONS:  The patient was counseled on the benign nature of gynecomastia. I explained that during the early phase (<1 year duration) of gynecomastia, the nodular (florid) pattern of male gynecomastia is the most common type and is commonly associated with the clinical symptoms of a painful retroareolar breast mass or enlargement. He was reassured that his mammogram and clinical symptoms are benign.      We had a discussion regarding the use of certain drugs for treating prostatomegaly (5-alpha-reductase inhibitors) and how they can sometimes cause gynecomastia.  The patient reports taking testosterone blocking shots, which he started about 2 years ago and took every 6 months for 3 injections total.  This is most likely the cause of the patient's bilateral male gynecomastia.  I encouraged him to talk with the doctor that prescribes him this medication regarding this side effect.     No imaging follow-up is warranted at this time.     Further management of any symptomatic or palpable concern should be determined clinically. Recommend clinical follow-up of the left breast area concern.  I also encouraged him to monitor his area of concern and return to the [facility] if he identifies any changes that are worrisome to him.        Assessment/Plan:  Charu Cancino is a 80 yo male with PMHx of HTN, HLD, PAD, CAD with stent placement, and prostate cancer (s/p surgery, radiation, and hormone therapy) who presents with 2 month  history of left breast mass with mammogram and US findings consistent with gynecomastia. Recent testosterone and prolactin labs within normal limits. Patient following urology for prostate history, still taking finasteride daily. Patient reports improvement in swelling of left breast, not interested in surgical management of gynecomastia.    - RTC as needed    Aníbal Cueva, MS3  [facility] [employer]    Above patient seen and evaluated independently. Agree with note. Edits made as necessary.    Phil Lawton MD  [facility]

## 2024-12-06 NOTE — PROGRESS NOTES
I have reviewed the notes, assessments, and/or procedures performed by the resident, I concur with her/his documentation of Joanna Cancino.     Confirmed and explained diagnosis of gynecomastia. If pt desires surgical management, this would require a referral to plastic surgery. No concern for malignancy on imaging     Sabrina Albarado MD

## 2024-12-09 ENCOUNTER — OFFICE VISIT (OUTPATIENT)
Dept: INFECTIOUS DISEASES | Facility: CLINIC | Age: 79
End: 2024-12-09
Payer: COMMERCIAL

## 2024-12-09 ENCOUNTER — LAB VISIT (OUTPATIENT)
Dept: LAB | Facility: HOSPITAL | Age: 79
End: 2024-12-09
Attending: NURSE PRACTITIONER
Payer: COMMERCIAL

## 2024-12-09 VITALS
TEMPERATURE: 98 F | HEART RATE: 66 BPM | WEIGHT: 166.44 LBS | RESPIRATION RATE: 12 BRPM | HEIGHT: 72 IN | DIASTOLIC BLOOD PRESSURE: 70 MMHG | SYSTOLIC BLOOD PRESSURE: 109 MMHG | BODY MASS INDEX: 22.54 KG/M2

## 2024-12-09 DIAGNOSIS — B18.2 CHRONIC HEPATITIS C WITHOUT HEPATIC COMA: ICD-10-CM

## 2024-12-09 DIAGNOSIS — R76.8 HEPATITIS C ANTIBODY POSITIVE IN BLOOD: ICD-10-CM

## 2024-12-09 DIAGNOSIS — Z23 NEED FOR VACCINATION: ICD-10-CM

## 2024-12-09 DIAGNOSIS — R76.8 HEPATITIS C ANTIBODY POSITIVE IN BLOOD: Primary | ICD-10-CM

## 2024-12-09 LAB
ALBUMIN SERPL-MCNC: 3.6 G/DL (ref 3.4–4.8)
ALBUMIN/GLOB SERPL: 1.1 RATIO (ref 1.1–2)
ALP SERPL-CCNC: 57 UNIT/L (ref 40–150)
ALT SERPL-CCNC: 36 UNIT/L (ref 0–55)
ANION GAP SERPL CALC-SCNC: 8 MEQ/L
AST SERPL-CCNC: 48 UNIT/L (ref 5–34)
BASOPHILS # BLD AUTO: 0.02 X10(3)/MCL
BASOPHILS NFR BLD AUTO: 0.3 %
BILIRUB SERPL-MCNC: 0.8 MG/DL
BUN SERPL-MCNC: 12.7 MG/DL (ref 8.4–25.7)
CALCIUM SERPL-MCNC: 9 MG/DL (ref 8.8–10)
CHLORIDE SERPL-SCNC: 103 MMOL/L (ref 98–107)
CO2 SERPL-SCNC: 25 MMOL/L (ref 23–31)
CREAT SERPL-MCNC: 0.91 MG/DL (ref 0.72–1.25)
CREAT/UREA NIT SERPL: 14
EOSINOPHIL # BLD AUTO: 0.15 X10(3)/MCL (ref 0–0.9)
EOSINOPHIL NFR BLD AUTO: 2.3 %
ERYTHROCYTE [DISTWIDTH] IN BLOOD BY AUTOMATED COUNT: 16.9 % (ref 11.5–17)
FERRITIN SERPL-MCNC: 95.52 NG/ML (ref 21.81–274.66)
GFR SERPLBLD CREATININE-BSD FMLA CKD-EPI: >60 ML/MIN/1.73/M2
GLOBULIN SER-MCNC: 3.3 GM/DL (ref 2.4–3.5)
GLUCOSE SERPL-MCNC: 95 MG/DL (ref 82–115)
HAV AB SER QL IA: REACTIVE
HAV IGM SERPL QL IA: NONREACTIVE
HCT VFR BLD AUTO: 41.1 % (ref 42–52)
HGB BLD-MCNC: 14.1 G/DL (ref 14–18)
IMM GRANULOCYTES # BLD AUTO: 0.02 X10(3)/MCL (ref 0–0.04)
IMM GRANULOCYTES NFR BLD AUTO: 0.3 %
INR PPP: 1.1
LYMPHOCYTES # BLD AUTO: 1.86 X10(3)/MCL (ref 0.6–4.6)
LYMPHOCYTES NFR BLD AUTO: 28.7 %
MCH RBC QN AUTO: 28.7 PG (ref 27–31)
MCHC RBC AUTO-ENTMCNC: 34.3 G/DL (ref 33–36)
MCV RBC AUTO: 83.7 FL (ref 80–94)
MONOCYTES # BLD AUTO: 0.9 X10(3)/MCL (ref 0.1–1.3)
MONOCYTES NFR BLD AUTO: 13.9 %
NEUTROPHILS # BLD AUTO: 3.54 X10(3)/MCL (ref 2.1–9.2)
NEUTROPHILS NFR BLD AUTO: 54.5 %
NRBC BLD AUTO-RTO: 0 %
PLATELET # BLD AUTO: 161 X10(3)/MCL (ref 130–400)
PMV BLD AUTO: 10.2 FL (ref 7.4–10.4)
POTASSIUM SERPL-SCNC: 4.1 MMOL/L (ref 3.5–5.1)
PROT SERPL-MCNC: 6.9 GM/DL (ref 5.8–7.6)
PROTHROMBIN TIME: 14.3 SECONDS (ref 11.4–14)
RBC # BLD AUTO: 4.91 X10(6)/MCL (ref 4.7–6.1)
SODIUM SERPL-SCNC: 136 MMOL/L (ref 136–145)
T PALLIDUM AB SER QL: NONREACTIVE
WBC # BLD AUTO: 6.49 X10(3)/MCL (ref 4.5–11.5)

## 2024-12-09 PROCEDURE — 1159F MED LIST DOCD IN RCRD: CPT | Mod: CPTII,,, | Performed by: NURSE PRACTITIONER

## 2024-12-09 PROCEDURE — 1160F RVW MEDS BY RX/DR IN RCRD: CPT | Mod: CPTII,,, | Performed by: NURSE PRACTITIONER

## 2024-12-09 PROCEDURE — 99215 OFFICE O/P EST HI 40 MIN: CPT | Mod: PBBFAC | Performed by: NURSE PRACTITIONER

## 2024-12-09 PROCEDURE — 85025 COMPLETE CBC W/AUTO DIFF WBC: CPT

## 2024-12-09 PROCEDURE — 81596 NFCT DS CHRNC HCV 6 ASSAYS: CPT

## 2024-12-09 PROCEDURE — 3074F SYST BP LT 130 MM HG: CPT | Mod: CPTII,,, | Performed by: NURSE PRACTITIONER

## 2024-12-09 PROCEDURE — 3078F DIAST BP <80 MM HG: CPT | Mod: CPTII,,, | Performed by: NURSE PRACTITIONER

## 2024-12-09 PROCEDURE — G0009 ADMIN PNEUMOCOCCAL VACCINE: HCPCS | Mod: PBBFAC

## 2024-12-09 PROCEDURE — 80053 COMPREHEN METABOLIC PANEL: CPT

## 2024-12-09 PROCEDURE — 36415 COLL VENOUS BLD VENIPUNCTURE: CPT

## 2024-12-09 PROCEDURE — 82728 ASSAY OF FERRITIN: CPT

## 2024-12-09 PROCEDURE — 3288F FALL RISK ASSESSMENT DOCD: CPT | Mod: CPTII,,, | Performed by: NURSE PRACTITIONER

## 2024-12-09 PROCEDURE — 1126F AMNT PAIN NOTED NONE PRSNT: CPT | Mod: CPTII,,, | Performed by: NURSE PRACTITIONER

## 2024-12-09 PROCEDURE — 87522 HEPATITIS C REVRS TRNSCRPJ: CPT

## 2024-12-09 PROCEDURE — 86780 TREPONEMA PALLIDUM: CPT

## 2024-12-09 PROCEDURE — 90653 IIV ADJUVANT VACCINE IM: CPT | Mod: PBBFAC

## 2024-12-09 PROCEDURE — 90677 PCV20 VACCINE IM: CPT | Mod: PBBFAC

## 2024-12-09 PROCEDURE — 86039 ANTINUCLEAR ANTIBODIES (ANA): CPT

## 2024-12-09 PROCEDURE — 85610 PROTHROMBIN TIME: CPT

## 2024-12-09 PROCEDURE — 1101F PT FALLS ASSESS-DOCD LE1/YR: CPT | Mod: CPTII,,, | Performed by: NURSE PRACTITIONER

## 2024-12-09 PROCEDURE — 86709 HEPATITIS A IGM ANTIBODY: CPT

## 2024-12-09 PROCEDURE — 86708 HEPATITIS A ANTIBODY: CPT

## 2024-12-09 PROCEDURE — 99203 OFFICE O/P NEW LOW 30 MIN: CPT | Mod: S$PBB,,, | Performed by: NURSE PRACTITIONER

## 2024-12-09 PROCEDURE — 87902 NFCT AGT GNTYP ALYS HEP C: CPT

## 2024-12-09 PROCEDURE — G0008 ADMIN INFLUENZA VIRUS VAC: HCPCS | Mod: PBBFAC

## 2024-12-09 RX ADMIN — PNEUMOCOCCAL 20-VALENT CONJUGATE VACCINE 0.5 ML
2.2; 2.2; 2.2; 2.2; 2.2; 2.2; 2.2; 2.2; 2.2; 2.2; 2.2; 2.2; 2.2; 2.2; 2.2; 2.2; 4.4; 2.2; 2.2; 2.2 INJECTION, SUSPENSION INTRAMUSCULAR at 11:12

## 2024-12-09 RX ADMIN — INFLUENZA A VIRUS A/VICTORIA/4897/2022 IVR-238 (H1N1) ANTIGEN (FORMALDEHYDE INACTIVATED), INFLUENZA A VIRUS A/THAILAND/8/2022 IVR-237 (H3N2) ANTIGEN (FORMALDEHYDE INACTIVATED), INFLUENZA B VIRUS B/AUSTRIA/1359417/2021 BVR-26 ANTIGEN (FORMALDEHYDE INACTIVATED) 0.5 ML: 15; 15; 15 INJECTION, SUSPENSION INTRAMUSCULAR at 11:12

## 2024-12-09 NOTE — PROGRESS NOTES
Patient ID: Joanna Cancino 79 y.o.     Chief Complaint:   Chief Complaint   Patient presents with    New Patient Hep C     States was diagnosed 22 years ago here at [facility], Denies symptoms        HPI:    24  Mr. Box is a 80 yo AAM referred to [facility] by PCP Dr. Jameson 24 for HCV evaluation.  He tells me that he was 1st diagnosed with HCV >20 years ago and is treatment naive. He has a history significant for home tattoo and ear piercing. He denies any history of IVDU, or any illicit drug use, no known HCV + sexual contacts.  He has received a blood transfusion in recent years, post HCV diagnosis. He does socially drink beer, 2-3 per day. Labs collected  AST 28, ALT 16.  HIV NR, Hep B negative & non-immune.  He denies jaundice, icterus, nausea, vomiting, dark urine, or bob colored stools.  He appreciates flu & pneumonia vaccinations today as recommended. RUQ abd u/s completed 24 with coarsened echotexture of liver noted. Will collect labs today for confirmation of active HCV and complete pre-treatment labs. All questions answered & concerns addressed.             Past Medical History:   Diagnosis Date    HLD (hyperlipidemia)     HTN (hypertension)     PAD (peripheral artery disease)     Past heart attack         Past Surgical History:   Procedure Laterality Date    CORONARY ANGIOPLASTY WITH STENT PLACEMENT      HERNIA REPAIR      PERIPHERAL ANGIOGRAPHY N/A 4/10/2024    Procedure: Peripheral angiography;  Surgeon: Alex Crawford MD;  Location: [facility] CATH LAB;  Service: Cardiology;  Laterality: N/A;  BIRGIT ANGIO *WILL NEED GORE & SHOCKWAVE*    PROSTATE SURGERY          Social History     Socioeconomic History    Marital status: Single   Tobacco Use    Smoking status: Former     Current packs/day: 0.00     Types: Cigarettes     Quit date:      Years since quittin.9    Smokeless tobacco: Never   Substance and Sexual Activity    Alcohol use: Yes     Alcohol/week: 2.0 standard drinks of alcohol      Types: 2 Cans of beer per week     Comment: daily    Drug use: Never    Sexual activity: Not Currently     Partners: Female     Comment: No relationship     Social Drivers of Health     Financial Resource Strain: Low Risk  (8/25/2024)    Overall Financial Resource Strain (CARDIA)     Difficulty of Paying Living Expenses: Not hard at all   Food Insecurity: No Food Insecurity (8/25/2024)    Hunger Vital Sign     Worried About Running Out of Food in the Last Year: Never true     Ran Out of Food in the Last Year: Never true   Transportation Needs: No Transportation Needs (8/25/2024)    TRANSPORTATION NEEDS     Transportation : No   Stress: No Stress Concern Present (8/25/2024)    [employer] - Occupational Stress Questionnaire     Feeling of Stress : Not at all   Housing Stability: Low Risk  (8/25/2024)    Housing Stability Vital Sign     Unable to Pay for Housing in the Last Year: No     Homeless in the Last Year: No        Family History   Problem Relation Name Age of Onset    Kidney disease Father      Heart disease Father          Review of patient's allergies indicates:  No Known Allergies     Immunization History   Administered Date(s) Administered    COVID-19, MRNA, LN-S, PF (MODERNA FULL 0.5 ML DOSE) 03/17/2021, 04/14/2021    Influenza - Trivalent - Fluad - Adjuvanted - PF (65 years and older 12/09/2024    Pneumococcal Conjugate - 20 Valent 12/09/2024        Review of Systems   Constitutional: Negative.    HENT: Negative.     Eyes: Negative.    Respiratory: Negative.     Cardiovascular: Negative.    Gastrointestinal: Negative.    Genitourinary: Negative.    Musculoskeletal: Negative.    Skin: Negative.    Neurological: Negative.    Endo/Heme/Allergies: Negative.    Psychiatric/Behavioral: Negative.     All other systems reviewed and are negative.         Objective:      /70 (BP Location: Right arm, Patient Position: Sitting)   Pulse 66   Temp 98.2 °F (36.8 °C) (Oral)   Resp  12   Ht 6' (1.829 m)   Wt 75.5 kg (166 lb 7.2 oz)   BMI 22.57 kg/m²      Physical Exam  Vitals reviewed.   Constitutional:       General: He is not in acute distress.     Appearance: Normal appearance. He is not toxic-appearing.   Eyes:      General: No scleral icterus.  Cardiovascular:      Rate and Rhythm: Normal rate. Rhythm irregular.      Heart sounds: Normal heart sounds.   Pulmonary:      Effort: Pulmonary effort is normal. No respiratory distress.      Breath sounds: Normal breath sounds.   Abdominal:      General: Bowel sounds are normal. There is no distension.      Palpations: Abdomen is soft. There is no mass.      Tenderness: There is no abdominal tenderness.   Musculoskeletal:         General: Normal range of motion.   Skin:     General: Skin is warm and dry.   Neurological:      Mental Status: He is alert and oriented to person, place, and time.          Labs:   Lab Results   Component Value Date    WBC 4.89 09/25/2024    HGB 13.0 (L) 09/25/2024    HCT 38.9 (L) 09/25/2024    MCV 83.1 09/25/2024     09/25/2024       CMP  Sodium   Date Value Ref Range Status   09/25/2024 136 136 - 145 mmol/L Final     Potassium   Date Value Ref Range Status   09/25/2024 4.1 3.5 - 5.1 mmol/L Final     Chloride   Date Value Ref Range Status   09/25/2024 108 (H) 98 - 107 mmol/L Final     CO2   Date Value Ref Range Status   09/25/2024 22 (L) 23 - 31 mmol/L Final     Blood Urea Nitrogen   Date Value Ref Range Status   09/25/2024 13.3 8.4 - 25.7 mg/dL Final     Creatinine   Date Value Ref Range Status   09/25/2024 0.93 0.73 - 1.18 mg/dL Final     Calcium   Date Value Ref Range Status   09/25/2024 9.1 8.8 - 10.0 mg/dL Final     Albumin   Date Value Ref Range Status   09/25/2024 3.2 (L) 3.4 - 4.8 g/dL Final     Bilirubin Total   Date Value Ref Range Status   09/25/2024 0.7 <=1.5 mg/dL Final     ALP   Date Value Ref Range Status   09/25/2024 81 40 - 150 unit/L Final     AST   Date Value Ref Range Status   09/25/2024 28  5 - 34 unit/L Final     ALT   Date Value Ref Range Status   2024 16 0 - 55 unit/L Final     eGFR   Date Value Ref Range Status   2024 >60 mL/min/1.73/m2 Final     Lab Results   Component Value Date    TSH 6.000 (H) 2019     HIV   Date Value Ref Range Status   2024 Nonreactive Nonreactive Final     INR   Date Value Ref Range Status   2020 1.2 0.0 - 1.3 Final     Hep BsAb Interp   Date Value Ref Range Status   2024 Nonreactive Nonreactive Final     Hep BsAb   Date Value Ref Range Status   2024 0.11 mIU/mL Final     Comment:     Interpretive Data Hep Bs Ab#  Result (mIU/mL)Interpretation - Hep B Surface Antibody  <8.00Nonreactive: Patient considered not immune to HBV infection  >=8.00 to <12.00Grayzone: Unable to determine immune status. Follow-up testing should be performed  >=12.00Reactive: Patient considered immune to HBV infection       Hep BcAb Interp   Date Value Ref Range Status   2024 Nonreactive Nonreactive Final     Ferritin Level   Date Value Ref Range Status   2024 189.03 21.81 - 274.66 ng/mL Final       Imaging: Reviewed most recent relevant imaging studies available, notable results highlighted in this note      Medications:     Current Outpatient Medications   Medication Instructions    acetaminophen (TYLENOL) 650 mg, Oral, Every 4 hours PRN    acetaZOLAMIDE (DIAMOX) 500 mg, 2 times daily    aspirin (ECOTRIN) 81 mg, Daily    atorvastatin (LIPITOR) 40 MG tablet SMARTSI Tablet(s) By Mouth Every Evening    clopidogreL (PLAVIX) 75 mg, Oral, Daily    COMBIGAN 0.2-0.5 % Drop 1 drop, 2 times daily    finasteride (PROSCAR) 5 mg, Daily    fluticasone propionate (FLONASE) 50 mcg, Each Nostril, 2 times daily PRN    levocetirizine (XYZAL) 5 MG tablet 1 tablet, Daily    meclizine (ANTIVERT) 12.5 mg, Oral, 3 times daily PRN    metoprolol tartrate (LOPRESSOR) 25 mg, 2 times daily    multivitamin (THERAGRAN) per tablet 1 tablet, Daily    ranolazine (RANEXA) 500 MG  Tb12 1 tablet, 2 times daily    sildenafiL (VIAGRA) 100 mg, Daily PRN    VYZULTA 0.024 % Drop 1 drop, Nightly       Assessment:       Problem List Items Addressed This Visit    None  Visit Diagnoses       Hepatitis C antibody positive in blood    -  Primary    Need for vaccination        Relevant Medications    influenza (adjuvanted) (Fluad) 45 mcg/0.5 mL IM vaccine (> or = 64 yo) 0.5 mL (Completed)    pneumoc 20-cee conj-dip cr(PF) (PREVNAR-20 (PF)) injection Syrg 0.5 mL (Completed) (Start on 12/9/2024 12:30 PM)               Plan:      Hepatitis C antibody positive in blood  -     Ambulatory referral/consult to Infectious Disease  Diagnosed approximately 2000 or earlier.  Treatment naive.  GT , baseline VL to be collected today.  Fibrosure: to be collected today.  FibroScan 12/13/24.  RUQ abdominal u/s: 11/27/24 coarsened echotexture of liver.  Blood precautions: do not share a razor, needle, toothbrush, clippers with anyone.  Labs today as ordered.  RTC approximately 2-4 weeks with Imani.    Need for vaccination  -     influenza (adjuvanted) (Fluad) 45 mcg/0.5 mL IM vaccine (> or = 64 yo) 0.5 mL  -     pneumoc 20-cee conj-dip cr(PF) (PREVNAR-20 (PF)) injection Syrg 0.5 mL  High dose flu vaccine & PCV 20 today.

## 2024-12-09 NOTE — PROGRESS NOTES
High Dose Influenza and Pneumonia Vaccinations given IM right deltoid using aseptic tech. Patient tolerated well. To contact clinic prn.

## 2024-12-10 LAB
ANA SER QL HEP2 SUBST: NORMAL
HCV RNA SERPL NAA+PROBE-ACNC: ABNORMAL IU/ML

## 2024-12-11 LAB — HCV GENTYP SERPL NAA+PROBE: 2

## 2024-12-12 LAB
A2 MACROGLOB SERPL-MCNC: 225 MG/DL (ref 100–280)
ALT SERPL W P-5'-P-CCNC: 42 U/L (ref 7–55)
ANNOTATION COMMENT IMP: NORMAL
APO A-I SERPL-MCNC: 195 MG/DL
BILIRUB SERPL-MCNC: 0.7 MG/DL (ref 0–1.2)
FIBROSIS STAGE SERPL QL: NORMAL
GGT SERPL-CCNC: 22 U/L (ref 8–61)
HAPTOGLOB SERPL NEPH-MCNC: 148 MG/DL (ref 30–200)
LIVER FIBR SCORE SERPL CALC.FIBROSURE: 0.31
LIVER FIBROSIS INTERPRETATION SER-IMP: NORMAL
NECROINFLAMMATORY ACT GRADE SERPL QL: NORMAL
NECROINFLAMMATORY ACT SCORE SERPL: 0.24
NECROINFLAMMATORY ACTIV INTERP SER-IMP: NORMAL
SERIAL #: NORMAL

## 2024-12-13 ENCOUNTER — PROCEDURE VISIT (OUTPATIENT)
Dept: GASTROENTEROLOGY | Facility: CLINIC | Age: 79
End: 2024-12-13
Payer: COMMERCIAL

## 2024-12-13 DIAGNOSIS — Z78.9 ALCOHOL USE: ICD-10-CM

## 2024-12-13 DIAGNOSIS — K76.0 HEPATIC STEATOSIS: Primary | ICD-10-CM

## 2024-12-13 NOTE — PROGRESS NOTES
Patient here for FibroScan visit. Reports NPO X3 hours and denies any implanted electronic devices. Position patient for the procedure to expose the right rib cage. Explained procedure to the patient. FibroScan exam complete and was tolerated without any problems.

## 2025-01-08 ENCOUNTER — OFFICE VISIT (OUTPATIENT)
Dept: INFECTIOUS DISEASES | Facility: CLINIC | Age: 80
End: 2025-01-08
Payer: COMMERCIAL

## 2025-01-08 VITALS
HEART RATE: 76 BPM | WEIGHT: 160.81 LBS | HEIGHT: 72 IN | RESPIRATION RATE: 12 BRPM | BODY MASS INDEX: 21.78 KG/M2 | SYSTOLIC BLOOD PRESSURE: 114 MMHG | TEMPERATURE: 98 F | DIASTOLIC BLOOD PRESSURE: 68 MMHG

## 2025-01-08 DIAGNOSIS — E78.5 HYPERLIPIDEMIA, UNSPECIFIED HYPERLIPIDEMIA TYPE: ICD-10-CM

## 2025-01-08 DIAGNOSIS — B18.2 CHRONIC HEPATITIS C WITHOUT HEPATIC COMA: Primary | ICD-10-CM

## 2025-01-08 DIAGNOSIS — Z23 NEED FOR VACCINATION: ICD-10-CM

## 2025-01-08 PROCEDURE — 90739 HEPB VACC 2/4 DOSE ADULT IM: CPT | Mod: PBBFAC

## 2025-01-08 PROCEDURE — G0010 ADMIN HEPATITIS B VACCINE: HCPCS | Mod: PBBFAC

## 2025-01-08 PROCEDURE — 99214 OFFICE O/P EST MOD 30 MIN: CPT | Mod: PBBFAC | Performed by: NURSE PRACTITIONER

## 2025-01-08 RX ORDER — ATORVASTATIN CALCIUM 20 MG/1
20 TABLET, FILM COATED ORAL DAILY
Qty: 30 TABLET | Refills: 2 | Status: SHIPPED | OUTPATIENT
Start: 2025-01-08 | End: 2026-01-08

## 2025-01-08 RX ORDER — VELPATASVIR AND SOFOSBUVIR 100; 400 MG/1; MG/1
1 TABLET, FILM COATED ORAL DAILY
Qty: 28 TABLET | Refills: 2 | Status: SHIPPED | OUTPATIENT
Start: 2025-01-08

## 2025-01-08 RX ADMIN — HEPATITIS B VACCINE (RECOMBINANT) ADJUVANTED 0.5 ML: 20 INJECTION, SOLUTION INTRAMUSCULAR at 10:01

## 2025-01-08 NOTE — PROGRESS NOTES
Patient ID: Joanna Cancino 79 y.o.     Chief Complaint:   Chief Complaint   Patient presents with    Followup Hep C     Denies problems        HPI:    1/8/25   is a 78 yo AAM here today for HCV f/u visit.  Labs collected 12/9/24, here for results and is ready to proceed with HCV treatment as well. He is immune to Hep A, non-immune to Hep B. Appreciates vaccination for Hep B, 1st dose today. He is currently taking atorvastatin 40 mg daily, will dose reduce to 20 mg per day while on Epclusa only.  He is eager to start treatment and eradicate HCV.  He agrees to adhere to treatment recommendations and remains clean and sober. All questions answered & concerns addressed.    12/9/24  Mr. Box is a 78 yo AAM referred to [facility] by PCP Dr. Jameson 11/8/24 for HCV evaluation.  He tells me that he was 1st diagnosed with HCV >20 years ago and is treatment naive. He has a history significant for home tattoo and ear piercing. He denies any history of IVDU, or any illicit drug use, no known HCV + sexual contacts.  He has received a blood transfusion in recent years, post HCV diagnosis. He does socially drink beer, 2-3 per day. Labs collected 9/24 AST 28, ALT 16. 11/24 HIV NR, Hep B negative & non-immune.  He denies jaundice, icterus, nausea, vomiting, dark urine, or bob colored stools.  He appreciates flu & pneumonia vaccinations today as recommended. RUQ abd u/s completed 11/27/24 with coarsened echotexture of liver noted. Will collect labs today for confirmation of active HCV and complete pre-treatment labs. All questions answered & concerns addressed.           Past Medical History:   Diagnosis Date    HLD (hyperlipidemia)     HTN (hypertension)     PAD (peripheral artery disease)     Past heart attack         Past Surgical History:   Procedure Laterality Date    CORONARY ANGIOPLASTY WITH STENT PLACEMENT      HERNIA REPAIR      PERIPHERAL ANGIOGRAPHY N/A 4/10/2024    Procedure: Peripheral angiography;  Surgeon: Ty  MD Alex;  Location: [facility] CATH LAB;  Service: Cardiology;  Laterality: N/A;  BIRGIT ANGIO *WILL NEED GORE & SHOCKWAVE*    PROSTATE SURGERY          Social History     Socioeconomic History    Marital status: Single   Tobacco Use    Smoking status: Former     Current packs/day: 0.00     Types: Cigarettes     Quit date:      Years since quittin.0    Smokeless tobacco: Never    Tobacco comments:     Stopped in    Substance and Sexual Activity    Alcohol use: Yes     Alcohol/week: 2.0 standard drinks of alcohol     Types: 2 Cans of beer per week     Comment: daily    Drug use: Never    Sexual activity: Not Currently     Partners: Female     Comment: No relationship     Social Drivers of Health     Financial Resource Strain: Low Risk  (2024)    Overall Financial Resource Strain (CARDIA)     Difficulty of Paying Living Expenses: Not hard at all   Food Insecurity: No Food Insecurity (2024)    Hunger Vital Sign     Worried About Running Out of Food in the Last Year: Never true     Ran Out of Food in the Last Year: Never true   Transportation Needs: No Transportation Needs (2024)    TRANSPORTATION NEEDS     Transportation : No   Stress: No Stress Concern Present (2024)    [employer] [place] [employer] - Occupational Stress Questionnaire     Feeling of Stress : Not at all   Housing Stability: Low Risk  (2024)    Housing Stability Vital Sign     Unable to Pay for Housing in the Last Year: No     Homeless in the Last Year: No        Family History   Problem Relation Name Age of Onset    Kidney disease Father      Heart disease Father          Review of patient's allergies indicates:  No Known Allergies     Immunization History   Administered Date(s) Administered    COVID-19, MRNA, LN-S, PF (MODERNA FULL 0.5 ML DOSE) 2021, 2021    Hepatitis B (recombinant) Adjuvanted, 2 dose 2025    Influenza - Trivalent - Fluad - Adjuvanted - PF (65 years and older 2024     Pneumococcal Conjugate - 20 Valent 12/09/2024        Review of Systems   Constitutional: Negative.    HENT: Negative.     Eyes: Negative.    Respiratory: Negative.     Cardiovascular: Negative.    Gastrointestinal: Negative.    Genitourinary: Negative.    Musculoskeletal: Negative.    Skin: Negative.    Neurological: Negative.    Endo/Heme/Allergies: Negative.    Psychiatric/Behavioral: Negative.     All other systems reviewed and are negative.         Objective:      /68 (BP Location: Left arm, Patient Position: Sitting)   Pulse 76   Temp 97.8 °F (36.6 °C) (Oral)   Resp 12   Ht 6' (1.829 m)   Wt 72.9 kg (160 lb 12.8 oz)   BMI 21.81 kg/m²      Physical Exam  Vitals reviewed.   Constitutional:       General: He is not in acute distress.     Appearance: Normal appearance. He is not toxic-appearing.   Eyes:      General: No scleral icterus.  Cardiovascular:      Rate and Rhythm: Normal rate and regular rhythm.      Heart sounds: Normal heart sounds.   Pulmonary:      Effort: Pulmonary effort is normal. No respiratory distress.      Breath sounds: Normal breath sounds.   Abdominal:      General: Bowel sounds are normal. There is no distension.      Palpations: Abdomen is soft. There is no mass.      Tenderness: There is no abdominal tenderness.   Musculoskeletal:         General: Normal range of motion.   Skin:     General: Skin is warm and dry.   Neurological:      Mental Status: He is alert and oriented to person, place, and time.          Labs:   Lab Results   Component Value Date    WBC 6.49 12/09/2024    HGB 14.1 12/09/2024    HCT 41.1 (L) 12/09/2024    MCV 83.7 12/09/2024     12/09/2024       CMP  Sodium   Date Value Ref Range Status   12/09/2024 136 136 - 145 mmol/L Final     Potassium   Date Value Ref Range Status   12/09/2024 4.1 3.5 - 5.1 mmol/L Final     Chloride   Date Value Ref Range Status   12/09/2024 103 98 - 107 mmol/L Final     CO2   Date Value Ref Range Status   12/09/2024 25 23 -  31 mmol/L Final     Blood Urea Nitrogen   Date Value Ref Range Status   12/09/2024 12.7 8.4 - 25.7 mg/dL Final     Creatinine   Date Value Ref Range Status   12/09/2024 0.91 0.72 - 1.25 mg/dL Final     Calcium   Date Value Ref Range Status   12/09/2024 9.0 8.8 - 10.0 mg/dL Final     Albumin   Date Value Ref Range Status   12/09/2024 3.6 3.4 - 4.8 g/dL Final     Bilirubin Total   Date Value Ref Range Status   12/09/2024 0.8 <=1.5 mg/dL Final     ALP   Date Value Ref Range Status   12/09/2024 57 40 - 150 unit/L Final     AST   Date Value Ref Range Status   12/09/2024 48 (H) 5 - 34 unit/L Final     ALT   Date Value Ref Range Status   12/09/2024 36 0 - 55 unit/L Final     eGFR   Date Value Ref Range Status   12/09/2024 >60 mL/min/1.73/m2 Final     Lab Results   Component Value Date    TSH 6.000 (H) 04/05/2019     HCV Genotype   Date Value Ref Range Status   12/09/2024 2 (A) Undetected Final     Comment:        -------------------ADDITIONAL INFORMATION-------------------  This test was performed using the Abbott RealTime HCV   Genotype II assay ([employer] [place], [place]).     Test Performed by:  [place] [address]  : Angelica Ro Ph.D.; CLIA# [id number]     HCV RNA Detect/Quant   Date Value Ref Range Status   12/09/2024 7906140 (A) Undetected IU/mL Final     Comment:     Result in log IU/mL is 7.00.     -------------------ADDITIONAL INFORMATION-------------------  The quantification range of this assay is 15 to 100,000,000   IU/mL (1.18 log to 8.00 log IU/mL). Testing was performed   using the rangel HCV test ([employer]     Test Performed by:  [address] [address]  : Angelica Ro Ph.D.; CLIA# [id number]     HIV   Date Value Ref Range Status   11/08/2024 Nonreactive Nonreactive Final     INR   Date  Value Ref Range Status   12/09/2024 1.1 <=1.3 Final     Syphilis Antibody   Date Value Ref Range Status   12/09/2024 Nonreactive Nonreactive, Equivocal Final     Hep BsAb Interp   Date Value Ref Range Status   11/08/2024 Nonreactive Nonreactive Final     Hep BsAb   Date Value Ref Range Status   11/08/2024 0.11 mIU/mL Final     Comment:     Interpretive Data Hep Bs Ab#  Result (mIU/mL)Interpretation - Hep B Surface Antibody  <8.00Nonreactive: Patient considered not immune to HBV infection  >=8.00 to <12.00Grayzone: Unable to determine immune status. Follow-up testing should be performed  >=12.00Reactive: Patient considered immune to HBV infection       Hep BcAb Interp   Date Value Ref Range Status   11/08/2024 Nonreactive Nonreactive Final     Hep A IgG Interp   Date Value Ref Range Status   12/09/2024 Reactive (A) Nonreactive Final     FibroTest Stage   Date Value Ref Range Status   12/09/2024 F1-F2  Final     ActiTest Grade   Date Value Ref Range Status   12/09/2024 A0-A1  Final     Alpha-2-Macroglobulin   Date Value Ref Range Status   12/09/2024 225 100 - 280 mg/dL Final     Haptoglobin   Date Value Ref Range Status   12/09/2024 148 30 - 200 mg/dL Final     Alanine Aminotransferase (ALT)   Date Value Ref Range Status   12/09/2024 42 7 - 55 U/L Final     Gamma Glutamyltransferase (GGT)   Date Value Ref Range Status   12/09/2024 22 8 - 61 U/L Final     Bilirubin, Total   Date Value Ref Range Status   12/09/2024 0.7 0.0 - 1.2 mg/dL Final     Comment:        Test Performed by:  [facility]  [address] [place] [address]  : Angelica Ro Ph.D.; CLIA# [id number]     Test Performed by:  [facility]  [address] [place] [address]  : Angelica Ro Ph.D.; CLIA# [id number]     Ferritin Level   Date Value Ref Range Status   12/09/2024 95.52 21.81 - 274.66 ng/mL Final     Antinuclear Ab, HEp-2  Substrate   Date Value Ref Range Status   2024 <1:80 (Negative) <1:80 (Negative) Final     Comment:        -------------------ADDITIONAL INFORMATION-------------------  Method: Immunofluorescence using HEp-2 cellular substrate.     Test Performed by:  [facility]  [address] [place] [address]  : Angelica Ro Ph.D.; CLIA# [id number]       Imaging: Reviewed most recent relevant imaging studies available, notable results highlighted in this note      Medications:     Current Outpatient Medications   Medication Instructions    acetaminophen (TYLENOL) 650 mg, Oral, Every 4 hours PRN    acetaZOLAMIDE (DIAMOX) 500 mg, 2 times daily    aspirin (ECOTRIN) 81 mg, Daily    atorvastatin (LIPITOR) 40 MG tablet SMARTSI Tablet(s) By Mouth Every Evening    atorvastatin (LIPITOR) 20 mg, Oral, Daily    clopidogreL (PLAVIX) 75 mg, Oral, Daily    COMBIGAN 0.2-0.5 % Drop 1 drop, 2 times daily    finasteride (PROSCAR) 5 mg, Daily    fluticasone propionate (FLONASE) 50 mcg, Each Nostril, 2 times daily PRN    levocetirizine (XYZAL) 5 MG tablet 1 tablet, Daily    meclizine (ANTIVERT) 12.5 mg, Oral, 3 times daily PRN    metoprolol tartrate (LOPRESSOR) 25 mg, 2 times daily    multivitamin (THERAGRAN) per tablet 1 tablet, Daily    ranolazine (RANEXA) 500 MG Tb12 1 tablet, 2 times daily    sildenafiL (VIAGRA) 100 mg, Daily PRN    sofosbuvir-velpatasvir (EPCLUSA) 400-100 mg Tab 1 tablet, Oral, Daily    VYZULTA 0.024 % Drop 1 drop, Nightly       Assessment:       Problem List Items Addressed This Visit    None  Visit Diagnoses       Chronic hepatitis C without hepatic coma    -  Primary    Relevant Medications    sofosbuvir-velpatasvir (EPCLUSA) 400-100 mg Tab    Hyperlipidemia, unspecified hyperlipidemia type        Relevant Medications    atorvastatin (LIPITOR) 20 MG tablet    Need for vaccination        Relevant Medications    hepatitis B (HEPLISAV-B) 20 mcg/0.5 mL  vaccine 0.5 mL (Completed)               Plan:      Chronic hepatitis C without hepatic coma  -     sofosbuvir-velpatasvir (EPCLUSA) 400-100 mg Tab; Take 1 tablet by mouth once daily.  Dispense: 28 tablet; Refill: 2  Diagnosed approximately 2000 or earlier.  Treatment naive.  GT 2, baseline VL 9.94 mil copies (12/9/24).  Fibrosure: A0-1, F1-2 (12/9/24).  FibroScan: S0, F0-1  (12/13/24).  RUQ abdominal u/s: 11/27/24 coarsened echotexture of liver.  Blood precautions: do not share a razor, needle, toothbrush, clippers with anyone.  Epclusa 1 po daily x 12 weeks.  Not a candidate for Mavyret due to CI with atorvastatin.   Refer to HCV  to initiate PA & treatment protocol.  RTC 4-6 weeks with Imani.    Hyperlipidemia, unspecified hyperlipidemia type  -     atorvastatin (LIPITOR) 20 MG tablet; Take 1 tablet (20 mg total) by mouth once daily.  Dispense: 30 tablet; Refill: 2  Dose reduce atorvastatin from 40 mg to 20 mg daily only while on Epclusa due to anticipated DDI.    Need for vaccination  -     hepatitis B (HEPLISAV-B) 20 mcg/0.5 mL vaccine 0.5 mL  Heplisav-B #1 today, #2 next visit.   Will discuss Shingrix vaccination next visit.

## 2025-02-04 ENCOUNTER — TELEPHONE (OUTPATIENT)
Dept: INFECTIOUS DISEASES | Facility: CLINIC | Age: 80
End: 2025-02-04
Payer: COMMERCIAL

## 2025-02-04 NOTE — TELEPHONE ENCOUNTER
brian waite (Guillermo: [id number])  PA Case ID #: [id number]  Need Help? Call us at [phone]  Status  Sent to Plan today  Drug  Epclusa 400-100MG tablets    Form  [employer] Electronic Prior Authorization Request Form (2017 NCPDP)

## 2025-02-05 ENCOUNTER — TELEPHONE (OUTPATIENT)
Dept: INFECTIOUS DISEASES | Facility: CLINIC | Age: 80
End: 2025-02-05
Payer: COMMERCIAL

## 2025-02-05 NOTE — TELEPHONE ENCOUNTER
February 5, 2025  Me   KR    2/5/25  8:14 AM  Note  ----- Message from Antonietta sent at 2/4/2025  3:32 PM CST -----  Pt of Imani Ricci with [facility] called requesting a call back.       Keiry call back number [phone]            Please advise              KR    2/5/25  8:15 AM  You contacted [facility]   KR    2/5/25  8:19 AM  Note  I called [facility], spoke with Janna who stated additional information is need to process PA, she will fax form.             Will document on another encounter

## 2025-02-05 NOTE — TELEPHONE ENCOUNTER
I called [facility], spoke with Janna who stated additional information is need to process PA, she will fax form.

## 2025-02-05 NOTE — TELEPHONE ENCOUNTER
brian waite (Guillermo: [id number])  PA Case ID #: [id number]  Need Help? Call us at [phone]  Outcome  Approved today by [employer] 2017  Approved. This drug has been approved under the Member's Medicare Part D benefit for SOFOSBUVIR-VELPATASVIR Tablet 400/100/MG. Approved quantity: 28 per 28 days. You may fill up to a 90 day supply except for those on Specialty Tier 5, which can be filled up to a 30 day supply. Please call the pharmacy to process the prescription claim.  Authorization Expiration Date: 4/29/2025  Drug  Epclusa 400-100MG tablets    Form  [employer] Medicare Electronic Prior Authorization Request Form (2017 NCPDP)

## 2025-02-05 NOTE — TELEPHONE ENCOUNTER
----- Message from Antonietta sent at 2/4/2025  3:32 PM CST -----  Pt of Imani Ricci with [facility] called requesting a call back.      Keiry call back number [phone]         Please advise

## 2025-02-10 ENCOUNTER — TELEPHONE (OUTPATIENT)
Dept: INFECTIOUS DISEASES | Facility: CLINIC | Age: 80
End: 2025-02-10
Payer: COMMERCIAL

## 2025-02-10 NOTE — TELEPHONE ENCOUNTER
Letter received from [facility] pharmacy stating due to insurance, rx for epclusa was transferred to [facility]. [facility]  and [facility] has been [facility]. I also provided the PA approval [facility]. Pt will need to contact [facility] to set up account as he has never filled with [facility] before, once done the rx will be processed and mailed to pt.  I called pt and provided him with number [phone] that he is to call and verify his personal info. I also informed pt he is to contact clinic prior to starting epclusa for education. Pt verbalizes understanding and will call them right away

## 2025-02-21 ENCOUNTER — TELEPHONE (OUTPATIENT)
Dept: INFECTIOUS DISEASES | Facility: CLINIC | Age: 80
End: 2025-02-21
Payer: COMMERCIAL

## 2025-02-21 NOTE — TELEPHONE ENCOUNTER
----- Message from Antoine sent at 2/21/2025  1:02 PM CST -----  Patient of [facility] message requesting a call back. [phone],

## 2025-02-25 ENCOUNTER — TELEPHONE (OUTPATIENT)
Dept: INFECTIOUS DISEASES | Facility: CLINIC | Age: 80
End: 2025-02-25
Payer: COMMERCIAL

## 2025-02-25 NOTE — TELEPHONE ENCOUNTER
Noted thank you. Pt will need to contact clinic for education on the day he received medication.I called pt and scheduled education nurse visit

## 2025-02-25 NOTE — TELEPHONE ENCOUNTER
----- Message from Nurse Metcalf sent at 2/21/2025 10:38 AM CST -----    ----- Message -----  From: Antonietta Solorzano  Sent: 2/21/2025  10:30 AM CST  To: [facility] Infectious Disease Clinical Support Sta#    Pt of Logan called stating that he was told to call whenever his medication arrives. Pt stated that his medication will arrive 03/05/25.Pt call back number  [phone] advise

## 2025-03-05 ENCOUNTER — TELEPHONE (OUTPATIENT)
Dept: INFECTIOUS DISEASES | Facility: CLINIC | Age: 80
End: 2025-03-05
Payer: COMMERCIAL

## 2025-03-05 NOTE — TELEPHONE ENCOUNTER
"Phoned patient. No answer. "I'm sorry, your call cannot be completed at this time. Please try your call again later."  "

## 2025-03-05 NOTE — TELEPHONE ENCOUNTER
----- Message from Bisi sent at 3/5/2025  2:28 PM CST -----  Regarding: Patient Call Back  Patient of EmiliePatient called into the clinic leaving a voicemail stating that he would like a call back from the nurse regarding his prescription.Patient callback number [phone] - [phone].

## 2025-03-06 NOTE — TELEPHONE ENCOUNTER
I returned call to pt who stated he did not receive epclusa rx as intended on 3/5/2025 so he called [facility] pharmacy and was told rx was not approved and needs to be transferred to [facility].    I called [facility] pharmacy and spoke with Sofia who stated rx was ran through insurance on 3/4/2025 for Brand Only epclusa, not sure why it was changed to brand only, I requested to speak with a supervisor as this has been in processing since 2/10/2025    I was transferred to Rodrick SAMUELS- who ran rx through "[employer] for generic epclusa which is approved with a paid claim, she is going to expedite claim which take 1-2 days. Once received they will contact pt to overnight medication to him.   Claim# [id number]    I called pt and discussed the above.

## 2025-03-10 ENCOUNTER — CLINICAL SUPPORT (OUTPATIENT)
Dept: INFECTIOUS DISEASES | Facility: CLINIC | Age: 80
End: 2025-03-10
Payer: COMMERCIAL

## 2025-03-10 DIAGNOSIS — B18.2 CHRONIC HEPATITIS C WITHOUT HEPATIC COMA: Primary | ICD-10-CM

## 2025-03-10 NOTE — PROGRESS NOTES
Patient came in today for HCV treatment education. The following instructions were given to pt:  Start Epclusa 400/100mg 1 tablet by mouth daily, preferably with evening meal.  Drink plenty of fluids.  Most common side effects are headache and fatigue, which should get better once taking medication.  Avoid alcohol and acetaminophen. May take ibuprofen prn unless contraindicated.   Notify clinic before starting any new prescriptions or over the counter medications.  If a dose is missed do not double up, continue with next scheduled dose or contact clinic.   Contact [facility] pharmacy 1 week prior to needing refill.  Appt date and time for lab order and follow up appts given to pt.  Rx for epclusa given to pt.  Copy of above information given to pt  Decrease atorvastatin to 20mg daily(from 40mg daily) while on epclusa.  Pt did receive rx for atorvastatin.  Daughter Bri was present for education     Pt verbalizes understanding of all above instructions.

## 2025-04-14 ENCOUNTER — TELEPHONE (OUTPATIENT)
Dept: INTERNAL MEDICINE | Facility: CLINIC | Age: 80
End: 2025-04-14
Payer: COMMERCIAL

## 2025-04-14 RX ORDER — FLUTICASONE PROPIONATE 50 MCG
1 SPRAY, SUSPENSION (ML) NASAL 2 TIMES DAILY PRN
Qty: 16 G | Refills: 3 | Status: SHIPPED | OUTPATIENT
Start: 2025-04-14

## 2025-04-14 NOTE — TELEPHONE ENCOUNTER
Patient called in with c/o sinus issues (a lot of mucus) , worse within the last 3 days. Taking medication that was prescribed but is not helping per patient. Please advise.

## 2025-04-14 NOTE — TELEPHONE ENCOUNTER
Called patient and date of birth verified. Patient c/o an increased amount of clear mucus over the past 2 weeks. He has been taking xyzal but it is not working and he ran out of flonase. Patient is requesting medication to get rid of the mucus and a refill for flonase. Please advise.

## 2025-04-14 NOTE — TELEPHONE ENCOUNTER
Called patient and date of birth verified. Notified patient of refill for flonase and recommendations from PCP. Patient verbalized understanding.

## 2025-04-17 ENCOUNTER — OFFICE VISIT (OUTPATIENT)
Dept: INFECTIOUS DISEASES | Facility: CLINIC | Age: 80
End: 2025-04-17
Payer: COMMERCIAL

## 2025-04-17 VITALS
DIASTOLIC BLOOD PRESSURE: 67 MMHG | BODY MASS INDEX: 22.47 KG/M2 | HEART RATE: 75 BPM | SYSTOLIC BLOOD PRESSURE: 109 MMHG | HEIGHT: 72 IN | TEMPERATURE: 98 F | WEIGHT: 165.88 LBS | RESPIRATION RATE: 10 BRPM

## 2025-04-17 DIAGNOSIS — B18.2 CHRONIC HEPATITIS C WITHOUT HEPATIC COMA: Primary | ICD-10-CM

## 2025-04-17 DIAGNOSIS — Z23 NEED FOR VACCINATION: ICD-10-CM

## 2025-04-17 PROCEDURE — G0010 ADMIN HEPATITIS B VACCINE: HCPCS | Mod: PBBFAC

## 2025-04-17 PROCEDURE — 99214 OFFICE O/P EST MOD 30 MIN: CPT | Mod: PBBFAC | Performed by: NURSE PRACTITIONER

## 2025-04-17 PROCEDURE — 90739 HEPB VACC 2/4 DOSE ADULT IM: CPT | Mod: PBBFAC

## 2025-04-17 RX ADMIN — HEPATITIS B VACCINE (RECOMBINANT) ADJUVANTED 0.5 ML: 20 INJECTION, SOLUTION INTRAMUSCULAR at 02:04

## 2025-04-17 NOTE — PROGRESS NOTES
Patient ID: Joanna Cancino 79 y.o.     Chief Complaint:   Chief Complaint   Patient presents with    Followup Hep C     States doing well on medication        HPI:    4/17/25  Mr. Cancino is a 78 yo AAM presenting today for HCV f/u visit.  He started Epclusa on 3/10/25 as prescribed last visit. He has been taking daily and is tolerating it well without any noted side effects. He has stopped drinking beer, switched to NA formulation. He is feeling and is amenable to 2nd dose of Heplisav-B vaccine today. Will defer Shingrix due to holiday weekend, but is amenable to same. He denies jaundice, icterus, nausea, vomiting, dark urine, or bob colored stools.  He has no questions or concerns today.     1/8/25   is a 78 yo AAM here today for HCV f/u visit.  Labs collected 12/9/24, here for results and is ready to proceed with HCV treatment as well. He is immune to Hep A, non-immune to Hep B. Appreciates vaccination for Hep B, 1st dose today. He is currently taking atorvastatin 40 mg daily, will dose reduce to 20 mg per day while on Epclusa only.  He is eager to start treatment and eradicate HCV.  He agrees to adhere to treatment recommendations and remains clean and sober. All questions answered & concerns addressed.     12/9/24  Mr. Box is a 78 yo AAM referred to [facility] by PCP Dr. Jameson 11/8/24 for HCV evaluation.  He tells me that he was 1st diagnosed with HCV >20 years ago and is treatment naive. He has a history significant for home tattoo and ear piercing. He denies any history of IVDU, or any illicit drug use, no known HCV + sexual contacts.  He has received a blood transfusion in recent years, post HCV diagnosis. He does socially drink beer, 2-3 per day. Labs collected 9/24 AST 28, ALT 16. 11/24 HIV NR, Hep B negative & non-immune.  He denies jaundice, icterus, nausea, vomiting, dark urine, or bob colored stools.  He appreciates flu & pneumonia vaccinations today as recommended. RUQ abd u/s completed  24 with coarsened echotexture of liver noted. Will collect labs today for confirmation of active HCV and complete pre-treatment labs. All questions answered & concerns addressed.           Past Medical History:   Diagnosis Date    HLD (hyperlipidemia)     HTN (hypertension)     PAD (peripheral artery disease)     Past heart attack         Past Surgical History:   Procedure Laterality Date    CORONARY ANGIOPLASTY WITH STENT PLACEMENT      HERNIA REPAIR      PERIPHERAL ANGIOGRAPHY N/A 4/10/2024    Procedure: Peripheral angiography;  Surgeon: Alex Crawford MD;  Location: [facility] CATH LAB;  Service: Cardiology;  Laterality: N/A;  BIRGIT ANGIO *WILL NEED GORE & SHOCKWAVE*    PROSTATE SURGERY          Social History     Socioeconomic History    Marital status: Single   Tobacco Use    Smoking status: Former     Current packs/day: 0.00     Types: Cigarettes     Quit date:      Years since quittin.3    Smokeless tobacco: Never    Tobacco comments:     Stopped in    Substance and Sexual Activity    Alcohol use: Yes     Alcohol/week: 2.0 standard drinks of alcohol     Types: 2 Cans of beer per week     Comment: daily, low alcohol content drinks    Drug use: Never    Sexual activity: Not Currently     Partners: Female     Comment: No relationship     Social Drivers of Health     Financial Resource Strain: Low Risk  (2024)    Overall Financial Resource Strain (CARDIA)     Difficulty of Paying Living Expenses: Not hard at all   Food Insecurity: No Food Insecurity (2024)    Hunger Vital Sign     Worried About Running Out of Food in the Last Year: Never true     Ran Out of Food in the Last Year: Never true   Transportation Needs: No Transportation Needs (2024)    TRANSPORTATION NEEDS     Transportation : No   Stress: No Stress Concern Present (2024)    [employer] - Occupational Stress Questionnaire     Feeling of Stress : Not at all   Housing Stability: Unknown  (8/25/2024)    Housing Stability Vital Sign     Unable to Pay for Housing in the Last Year: No     Homeless in the Last Year: No        Family History   Problem Relation Name Age of Onset    Kidney disease Father      Heart disease Father          Review of patient's allergies indicates:  No Known Allergies     Immunization History   Administered Date(s) Administered    COVID-19, MRNA, LN-S, PF (MODERNA FULL 0.5 ML DOSE) 03/17/2021, 04/14/2021    Hepatitis B (recombinant) Adjuvanted, 2 dose 01/08/2025, 04/17/2025    Influenza - Trivalent - Fluad - Adjuvanted - PF (65 years and older 12/09/2024    Pneumococcal Conjugate - 20 Valent 12/09/2024        Review of Systems   Constitutional: Negative.    HENT: Negative.     Eyes: Negative.    Respiratory:  Positive for cough and sputum production. Negative for hemoptysis, shortness of breath and wheezing.    Cardiovascular: Negative.    Gastrointestinal: Negative.    Genitourinary: Negative.    Musculoskeletal: Negative.    Skin: Negative.    Neurological: Negative.    Endo/Heme/Allergies: Negative.    Psychiatric/Behavioral: Negative.     All other systems reviewed and are negative.         Objective:      /67 (BP Location: Left arm, Patient Position: Sitting)   Pulse 75   Temp 98.3 °F (36.8 °C) (Oral)   Resp 10   Ht 6' (1.829 m)   Wt 75.3 kg (165 lb 14.4 oz)   BMI 22.50 kg/m²      Physical Exam  Vitals reviewed.   Constitutional:       General: He is not in acute distress.     Appearance: Normal appearance. He is not ill-appearing, toxic-appearing or diaphoretic.   Eyes:      General: No scleral icterus.  Cardiovascular:      Rate and Rhythm: Normal rate and regular rhythm.      Heart sounds: Normal heart sounds.   Pulmonary:      Effort: Pulmonary effort is normal. No respiratory distress.      Breath sounds: Rhonchi present.      Comments: Clears with cough.  Recommended OTC Mucinex for same.   Abdominal:      General: Bowel sounds are normal. There is no  distension.      Palpations: Abdomen is soft. There is no mass.      Tenderness: There is no abdominal tenderness.   Musculoskeletal:         General: Normal range of motion.   Skin:     General: Skin is warm and dry.   Neurological:      Mental Status: He is alert and oriented to person, place, and time.          Labs:   Lab Results   Component Value Date    WBC 6.49 12/09/2024    HGB 14.1 12/09/2024    HCT 41.1 (L) 12/09/2024    MCV 83.7 12/09/2024     12/09/2024       CMP  Sodium   Date Value Ref Range Status   12/09/2024 136 136 - 145 mmol/L Final     Potassium   Date Value Ref Range Status   12/09/2024 4.1 3.5 - 5.1 mmol/L Final     Chloride   Date Value Ref Range Status   12/09/2024 103 98 - 107 mmol/L Final     CO2   Date Value Ref Range Status   12/09/2024 25 23 - 31 mmol/L Final     Blood Urea Nitrogen   Date Value Ref Range Status   12/09/2024 12.7 8.4 - 25.7 mg/dL Final     Creatinine   Date Value Ref Range Status   12/09/2024 0.91 0.72 - 1.25 mg/dL Final     Calcium   Date Value Ref Range Status   12/09/2024 9.0 8.8 - 10.0 mg/dL Final     Albumin   Date Value Ref Range Status   12/09/2024 3.6 3.4 - 4.8 g/dL Final     Bilirubin Total   Date Value Ref Range Status   12/09/2024 0.8 <=1.5 mg/dL Final     ALP   Date Value Ref Range Status   12/09/2024 57 40 - 150 unit/L Final     AST   Date Value Ref Range Status   12/09/2024 48 (H) 5 - 34 unit/L Final     ALT   Date Value Ref Range Status   12/09/2024 36 0 - 55 unit/L Final     eGFR   Date Value Ref Range Status   12/09/2024 >60 mL/min/1.73/m2 Final     Lab Results   Component Value Date    TSH 6.000 (H) 04/05/2019     HCV Genotype   Date Value Ref Range Status   12/09/2024 2 (A) Undetected Final     Comment:        -------------------ADDITIONAL INFORMATION-------------------  This test was performed using the Abbott RealTime HCV   Genotype II assay ([employer] [place], [place]).     Test Performed by:  [facility]  [address] [address]  : Angelica Ro Ph.D.; CLIA# [id number]     HCV RNA Detect/Quant   Date Value Ref Range Status   12/09/2024 5845912 (A) Undetected IU/mL Final     Comment:     Result in log IU/mL is 7.00.     -------------------ADDITIONAL INFORMATION-------------------  The quantification range of this assay is 15 to 100,000,000   IU/mL (1.18 log to 8.00 log IU/mL). Testing was performed   using the rangel HCV test ([employer]     Test Performed by:  [place] [id number]  : Angelica Ro Ph.D.; CLIA# [id number]     HIV   Date Value Ref Range Status   11/08/2024 Nonreactive Nonreactive Final     INR   Date Value Ref Range Status   12/09/2024 1.1 <=1.3 Final     Syphilis Antibody   Date Value Ref Range Status   12/09/2024 Nonreactive Nonreactive, Equivocal Final     Hep BsAb Interp   Date Value Ref Range Status   11/08/2024 Nonreactive Nonreactive Final     Hep BsAb   Date Value Ref Range Status   11/08/2024 0.11 mIU/mL Final     Comment:     Interpretive Data Hep Bs Ab#  Result (mIU/mL)Interpretation - Hep B Surface Antibody  <8.00Nonreactive: Patient considered not immune to HBV infection  >=8.00 to <12.00Grayzone: Unable to determine immune status. Follow-up testing should be performed  >=12.00Reactive: Patient considered immune to HBV infection       Hep BcAb Interp   Date Value Ref Range Status   11/08/2024 Nonreactive Nonreactive Final     Hep A IgG Interp   Date Value Ref Range Status   12/09/2024 Reactive (A) Nonreactive Final     FibroTest Stage   Date Value Ref Range Status   12/09/2024 F1-F2  Final     ActiTest Grade   Date Value Ref Range Status   12/09/2024 A0-A1  Final     Alpha-2-Macroglobulin   Date Value Ref Range Status   12/09/2024 225 100 - 280 mg/dL Final     Haptoglobin   Date Value Ref Range Status   12/09/2024 148 30 - 200  mg/dL Final     Alanine Aminotransferase (ALT)   Date Value Ref Range Status   2024 42 7 - 55 U/L Final     Gamma Glutamyltransferase (GGT)   Date Value Ref Range Status   2024 22 8 - 61 U/L Final     Bilirubin, Total   Date Value Ref Range Status   2024 0.7 0.0 - 1.2 mg/dL Final     Comment:        Test Performed by:  [place] [address]  : Angelica Ro Ph.D.; CLIA# [id number]     Test Performed by:  [place] [address]  : Angelica Ro Ph.D.; CLIA# [id number]     Ferritin Level   Date Value Ref Range Status   2024 95.52 21.81 - 274.66 ng/mL Final     Antinuclear Ab, HEp-2 Substrate   Date Value Ref Range Status   2024 <1:80 (Negative) <1:80 (Negative) Final     Comment:        -------------------ADDITIONAL INFORMATION-------------------  Method: Immunofluorescence using HEp-2 cellular substrate.     Test Performed by:  [place] [address]  : Angelica Ro Ph.D.; CLIA# [id number]       Imaging: Reviewed most recent relevant imaging studies available, notable results highlighted in this note      Medications:     Current Outpatient Medications   Medication Instructions    acetaminophen (TYLENOL) 650 mg, Oral, Every 4 hours PRN    aspirin (ECOTRIN) 81 mg, Daily    atorvastatin (LIPITOR) 40 MG tablet SMARTSI Tablet(s) By Mouth Every Evening    atorvastatin (LIPITOR) 20 mg, Oral, Daily    clopidogreL (PLAVIX) 75 mg, Oral, Daily    COMBIGAN 0.2-0.5 % Drop 1 drop, 2 times daily    finasteride (PROSCAR) 5 mg, Daily    fluticasone propionate (FLONASE) 50 mcg, Each Nostril, 2 times daily PRN    levocetirizine (XYZAL) 5 MG tablet 1 tablet, Daily    meclizine (ANTIVERT) 12.5 mg, Oral, 3 times daily PRN    metoprolol tartrate  (LOPRESSOR) 25 mg, 2 times daily    multivitamin (THERAGRAN) per tablet 1 tablet, Daily    ranolazine (RANEXA) 500 MG Tb12 1 tablet, 2 times daily    sildenafiL (VIAGRA) 100 mg, Daily PRN    sofosbuvir-velpatasvir (EPCLUSA) 400-100 mg Tab 1 tablet, Oral, Daily    VYZULTA 0.024 % Drop 1 drop, Nightly       Assessment:       Problem List Items Addressed This Visit    None  Visit Diagnoses         Chronic hepatitis C without hepatic coma    -  Primary      Need for vaccination        Relevant Medications    hepatitis B (HEPLISAV-B) 20 mcg/0.5 mL vaccine 0.5 mL (Completed) (Start on 4/17/2025  3:15 PM)               Plan:      Chronic hepatitis C without hepatic coma  Diagnosed approximately 2000 or earlier.  Treatment naive.  GT 2, baseline VL 9.94 mil copies (12/9/24).  Fibrosure: A0-1, F1-2 (12/9/24).  FibroScan: S0, F0-1  (12/13/24).  RUQ abdominal u/s: 11/27/24 coarsened echotexture of liver.  Blood precautions: do not share a razor, needle, toothbrush, clippers with anyone.  Continue Epclusa 1 po daily x 12 weeks, started 3/10/25.  Not a candidate for Mavyret due to CI with atorvastatin.   RTC as scheduled with Imani.     Hyperlipidemia, unspecified hyperlipidemia type  -     atorvastatin (LIPITOR) 20 MG tablet; Take 1 tablet (20 mg total) by mouth once daily.  Dispense: 30 tablet; Refill: 2  Dose reduce atorvastatin from 40 mg to 20 mg daily only while on Epclusa due to anticipated DDI.    Need for vaccination  -     hepatitis B (HEPLISAV-B) 20 mcg/0.5 mL vaccine 0.5 mL  Heplisav-B #2 today.   Will consider Shingrix at next visit.

## 2025-04-22 ENCOUNTER — TELEPHONE (OUTPATIENT)
Dept: INFECTIOUS DISEASES | Facility: CLINIC | Age: 80
End: 2025-04-22
Payer: COMMERCIAL

## 2025-04-22 DIAGNOSIS — B18.2 CHRONIC HEPATITIS C WITHOUT HEPATIC COMA: Primary | ICD-10-CM

## 2025-04-22 NOTE — TELEPHONE ENCOUNTER
----- Message from Nurse Dasilva sent at 4/21/2025  3:03 PM CDT -----    ----- Message -----  From: Antoine Singletary  Sent: 4/21/2025   2:32 PM CDT  To: [facility] Infectious Disease Clinical Support Sta#    Patient of Jeremy Gordon called for PA renewal for (EPCLUSA).Thanks,

## 2025-04-23 NOTE — TELEPHONE ENCOUNTER
I called [facility] pharmacy and spoke with Grace and explained to her once the patient takes the medication for 12 weeks he will no longer need refills therefore a PA is not needed as this is not a continuing medication  She verified generic epclusa was delivered on 3/7/2025, 4/2/2025 and will be delivered on 4/25/2025

## 2025-05-21 ENCOUNTER — OFFICE VISIT (OUTPATIENT)
Dept: INTERNAL MEDICINE | Facility: CLINIC | Age: 80
End: 2025-05-21
Payer: COMMERCIAL

## 2025-05-21 VITALS
HEART RATE: 59 BPM | BODY MASS INDEX: 21.24 KG/M2 | TEMPERATURE: 98 F | RESPIRATION RATE: 20 BRPM | OXYGEN SATURATION: 97 % | WEIGHT: 156.81 LBS | DIASTOLIC BLOOD PRESSURE: 69 MMHG | HEIGHT: 72 IN | SYSTOLIC BLOOD PRESSURE: 107 MMHG

## 2025-05-21 DIAGNOSIS — Z79.899 OTHER LONG TERM (CURRENT) DRUG THERAPY: ICD-10-CM

## 2025-05-21 DIAGNOSIS — N62 GYNECOMASTIA: ICD-10-CM

## 2025-05-21 DIAGNOSIS — Z12.11 SPECIAL SCREENING FOR MALIGNANT NEOPLASMS, COLON: ICD-10-CM

## 2025-05-21 DIAGNOSIS — I10 HYPERTENSION, UNSPECIFIED TYPE: ICD-10-CM

## 2025-05-21 DIAGNOSIS — C61 PROSTATE CA: ICD-10-CM

## 2025-05-21 DIAGNOSIS — R76.8 HEPATITIS C ANTIBODY POSITIVE IN BLOOD: Primary | ICD-10-CM

## 2025-05-21 PROCEDURE — 99214 OFFICE O/P EST MOD 30 MIN: CPT | Mod: PBBFAC

## 2025-05-21 NOTE — PROGRESS NOTES
INTERNAL MEDICINE RESIDENT CLINIC  CLINIC NOTE    Patient Name: Joanna Cancino  YOB: 1945    PRESENTING HISTORY       History of Present Illness:  Mr. Joanna Cancino is a 79 y.o. male w/ history of HLD, HTN, PAD, CAD with stents placed. Previously seen by TERESA.  Presenting for follow up.    11/7/2024: He has complaints of chest pain related to left breast mass noted to be gynecomastia, features appearing benign. Chart review demonstrates previous use of testosterone blocking shots. He is currently on finasteride. Otherwise follows with Urology. He has no complaints today.    5/21/2025: No complaints today, has seen ID for Hep C. Continuing Epclusa. Otherwise he is doing well. Gynecomastia doing well, improving. Work up unrevealing.    CURRENT MEDICATIONS      Current Outpatient Medications on File Prior to Visit   Medication Sig    acetaminophen (TYLENOL) 325 MG tablet Take 2 tablets (650 mg total) by mouth every 4 (four) hours as needed for Pain.    aspirin (ECOTRIN) 81 MG EC tablet Take 81 mg by mouth once daily.    atorvastatin (LIPITOR) 20 MG tablet Take 1 tablet (20 mg total) by mouth once daily.    COMBIGAN 0.2-0.5 % Drop Place 1 drop into both eyes 2 (two) times daily.    finasteride (PROSCAR) 5 mg tablet Take 5 mg by mouth once daily.    fluticasone propionate (FLONASE) 50 mcg/actuation nasal spray 1 spray (50 mcg total) by Each Nostril route 2 (two) times daily as needed for Rhinitis or Allergies.    levocetirizine (XYZAL) 5 MG tablet Take 1 tablet by mouth once daily.    metoprolol tartrate (LOPRESSOR) 25 MG tablet Take 25 mg by mouth 2 (two) times daily.    multivitamin (THERAGRAN) per tablet Take 1 tablet by mouth once daily.    ranolazine (RANEXA) 500 MG Tb12 Take 1 tablet by mouth 2 (two) times daily.    sildenafiL (VIAGRA) 100 MG tablet Take 100 mg by mouth daily as needed.    sofosbuvir-velpatasvir (EPCLUSA) 400-100 mg Tab Take 1 tablet by mouth once daily.    VYZULTA 0.024 % Drop  Place 1 drop into both eyes nightly.    atorvastatin (LIPITOR) 40 MG tablet     clopidogreL (PLAVIX) 75 mg tablet Take 1 tablet (75 mg total) by mouth once daily.    meclizine (ANTIVERT) 12.5 mg tablet Take 1 tablet (12.5 mg total) by mouth 3 (three) times daily as needed for Dizziness. (Patient not taking: Reported on 2025)     No current facility-administered medications on file prior to visit.         Review of Systems   All other systems reviewed and are negative.      PAST HISTORY:     Past Medical History:   Diagnosis Date    HLD (hyperlipidemia)     HTN (hypertension)     PAD (peripheral artery disease)     Past heart attack        Past Surgical History:   Procedure Laterality Date    CORONARY ANGIOPLASTY WITH STENT PLACEMENT      HERNIA REPAIR      PERIPHERAL ANGIOGRAPHY N/A 4/10/2024    Procedure: Peripheral angiography;  Surgeon: Alex Crawford MD;  Location: [facility] CATH LAB;  Service: Cardiology;  Laterality: N/A;  BIRGIT ANGIO *WILL NEED GORE & SHOCKWAVE*    PROSTATE SURGERY         Family History   Problem Relation Name Age of Onset    Kidney disease Father      Heart disease Father         Social History     Socioeconomic History    Marital status: Single   Tobacco Use    Smoking status: Former     Current packs/day: 0.00     Types: Cigarettes     Quit date:      Years since quittin.4    Smokeless tobacco: Never    Tobacco comments:     Stopped in    Substance and Sexual Activity    Alcohol use: Not Currently     Alcohol/week: 2.0 standard drinks of alcohol     Types: 2 Cans of beer per week     Comment: daily, low alcohol content drinks    Drug use: Never    Sexual activity: Not Currently     Partners: Female     Comment: No relationship     Social Drivers of Health     Financial Resource Strain: Low Risk  (2025)    Overall Financial Resource Strain (CARDIA)     Difficulty of Paying Living Expenses: Not hard at all   Food Insecurity: No Food Insecurity (2025)    Hunger Vital  Sign     Worried About Running Out of Food in the Last Year: Never true     Ran Out of Food in the Last Year: Never true   Transportation Needs: No Transportation Needs (5/21/2025)    PRAPARE - Transportation     Lack of Transportation (Medical): No     Lack of Transportation (Non-Medical): No   Physical Activity: Insufficiently Active (5/21/2025)    Exercise Vital Sign     Days of Exercise per Week: 7 days     Minutes of Exercise per Session: 20 min   Stress: No Stress Concern Present (5/21/2025)    [place] [employer] - Occupational Stress Questionnaire     Feeling of Stress : Not at all   Housing Stability: Low Risk  (5/21/2025)    Housing Stability Vital Sign     Unable to Pay for Housing in the Last Year: No     Homeless in the Last Year: No       Review of patient's allergies indicates:  No Known Allergies    OBJECTIVE:   Vital Signs:  Vitals:    05/21/25 0850   BP: 107/69   Pulse: (!) 59   Resp: 20   Temp: 98 °F (36.7 °C)   TempSrc: Oral   SpO2: 97%   Weight: 71.1 kg (156 lb 12.8 oz)   Height: 6' (1.829 m)       No results found for this or any previous visit (from the past 24 hours).      Physical Exam  Constitutional:       General: He is not in acute distress.     Appearance: Normal appearance.   HENT:      Head: Normocephalic.   Eyes:      Extraocular Movements: Extraocular movements intact.      Conjunctiva/sclera: Conjunctivae normal.      Pupils: Pupils are equal, round, and reactive to light.   Cardiovascular:      Rate and Rhythm: Normal rate and regular rhythm.      Pulses: Normal pulses.      Heart sounds: Normal heart sounds.   Pulmonary:      Effort: Pulmonary effort is normal.   Abdominal:      General: Abdomen is flat.   Musculoskeletal:         General: Normal range of motion.      Cervical back: Normal range of motion.   Skin:     General: Skin is warm.      Capillary Refill: Capillary refill takes less than 2 seconds.   Neurological:      General: No focal deficit  "present.      Mental Status: He is alert.   Psychiatric:         Mood and Affect: Mood normal.         Laboratory  CMP:   Recent Labs   Lab 08/23/24  1807 08/24/24  0707 08/25/24  0619 09/25/24  1542 12/09/24  1149   Sodium 129 L   < > 130 L 136 136   Potassium 3.9   < > 4.3 4.1 4.1   CO2 15 L   < > 21 L 22 L 25   Blood Urea Nitrogen 11.7   < > 9.9 13.3 12.7   Creatinine 1.11   < > 0.89 0.93 0.91   Calcium 8.5 L   < > 8.7 L 9.1 9.0   Albumin 3.1 L  --   --  3.2 L 3.6   Bilirubin Total 0.5  --   --  0.7 0.8   AST 38 H  --   --  28 48 H   ALT 20  --   --  16 36   ALP 62  --   --  81 57    < > = values in this interval not displayed.     CBC:   Recent Labs   Lab 08/25/24  0619 09/25/24  1542 12/09/24  1149   WBC 6.54 4.89 6.49   Neut # 4.52 2.50 3.54   RBC 4.93 4.68 L 4.91   Hgb 13.8 L 13.0 L 14.1   Hct 40.2 L 38.9 L 41.1 L   Platelet 97 L 172 161   MCV 81.5 83.1 83.7   RDW 15.7 15.7 16.9     FLP:   Recent Labs   Lab 01/11/24  0828   Cholesterol Total 134   HDL Cholesterol 58   LDL Cholesterol 66.00   Triglyceride 51     DM:   Recent Labs   Lab 08/25/24  0619 09/25/24  1542 11/08/24  0730 12/09/24  1149   Hemoglobin A1c  --   --  5.2  --    Creatinine 0.89 0.93  --  0.91     Thyroid:       Invalid input(s): "[id number]"  Anemia:   Recent Labs   Lab 11/08/24  0730 12/09/24  1149   Hgb  --  14.1   Hct  --  41.1 L   Iron Level 85  --    Ferritin Level 189.03 95.52   Iron Binding Capacity Total 252  --    Vitamin B12 645  --    Folate Level 17.1  --            ASSESSMENT & PLAN:     Gynecomastia  - Likely 2/2 to testosterone blocking agents previously noted 2 years ago  - Imaging via [facility] doing well.  - Will obtain testosterone (advised morning obtaining), prolactin -> all WNL    HTN  Hx of PAD, CAD (2018 cath)  - Follows with [facility]  - Continue current medications (DAPT, Metoprolol 25mg, Ranolazine)  - Continue Lipitor when Epclusa tx finished  - BP today wnl    Prostate CA  - Follows with Urology  - Taking Finasteride  - " DXA scan normal    Chronic Hep C  - Treated with Epclusa  - Follows with ID  - Fibroscan S0, F0-1  - Ordered HCC screen    RTC 6 months    There are no diagnoses linked to this encounter.      Health Maintenance Due   Topic Date Due    TETANUS VACCINE  Never done    Shingles Vaccine (1 of 2) Never done    RSV Vaccine (Age 60+ and Pregnant patients) (1 - 1-dose 75+ series) Never done    COVID-19 Vaccine (3 - 2024-25 season) 09/01/2024           Health Maintenance/ Wellness  Pneumococcal vaccine: Deferred   TDAP: Deferred  Influenza vaccine: Deferred  Shingrix vaccine: Deferred  Screening colonoscopy: [place] Decision Making -> Wants Cologuard  Mammogram: Following with Radiology regarding bilateral gynecomastia, last done 10/2024 benign  Lung Cancer Screening: Non-smoker  Hepatitis Panel: Hep C positive, Sees ID  HIV - NR 11/2024    Future Appointments     Future Appointments   Date Time Provider Department Center   6/9/2025  2:00 PM Imani Khan APRN [facility]      No orders of the defined types were placed in this encounter.        Discussed with Dr. Saavedra  - Staff Attestation to Follow    Yon Jameson MD  [facility] INTERNAL MEDICINE PGY-3  05/21/2025 1:15 PM  [facility]

## 2025-05-28 ENCOUNTER — HOSPITAL ENCOUNTER (OUTPATIENT)
Dept: RADIOLOGY | Facility: HOSPITAL | Age: 80
Discharge: HOME | End: 2025-05-28
Payer: COMMERCIAL

## 2025-05-28 DIAGNOSIS — R76.8 HEPATITIS C ANTIBODY POSITIVE IN BLOOD: ICD-10-CM

## 2025-05-28 PROCEDURE — 76705 ECHO EXAM OF ABDOMEN: CPT | Mod: TC

## 2025-05-29 ENCOUNTER — TELEPHONE (OUTPATIENT)
Dept: INFECTIOUS DISEASES | Facility: CLINIC | Age: 80
End: 2025-05-29
Payer: COMMERCIAL

## 2025-05-29 DIAGNOSIS — E78.5 HYPERLIPIDEMIA, UNSPECIFIED HYPERLIPIDEMIA TYPE: ICD-10-CM

## 2025-05-29 RX ORDER — ATORVASTATIN CALCIUM 20 MG/1
20 TABLET, FILM COATED ORAL DAILY
Qty: 30 TABLET | Refills: 1 | OUTPATIENT
Start: 2025-05-29

## 2025-05-29 NOTE — TELEPHONE ENCOUNTER
Last visit with Imani Khan, APRN: 4/17/2025  Last visit in [facility] INFECTIOUS DISEASE: 4/17/2025    Patient's next visit in [facility] INFECTIOUS DISEASE: 6/9/2025     LL 12/09/2024    Please advise on medication refill

## 2025-06-09 ENCOUNTER — LAB VISIT (OUTPATIENT)
Dept: LAB | Facility: HOSPITAL | Age: 80
End: 2025-06-09
Attending: NURSE PRACTITIONER
Payer: COMMERCIAL

## 2025-06-09 ENCOUNTER — OFFICE VISIT (OUTPATIENT)
Dept: INFECTIOUS DISEASES | Facility: CLINIC | Age: 80
End: 2025-06-09
Payer: COMMERCIAL

## 2025-06-09 VITALS
SYSTOLIC BLOOD PRESSURE: 115 MMHG | RESPIRATION RATE: 10 BRPM | HEIGHT: 72 IN | BODY MASS INDEX: 21.19 KG/M2 | DIASTOLIC BLOOD PRESSURE: 72 MMHG | WEIGHT: 156.44 LBS | TEMPERATURE: 98 F | HEART RATE: 78 BPM

## 2025-06-09 DIAGNOSIS — B18.2 CHRONIC HEPATITIS C WITHOUT HEPATIC COMA: Primary | ICD-10-CM

## 2025-06-09 DIAGNOSIS — B18.2 CHRONIC HEPATITIS C WITHOUT HEPATIC COMA: ICD-10-CM

## 2025-06-09 LAB
ALBUMIN SERPL-MCNC: 3.7 G/DL (ref 3.4–4.8)
ALBUMIN/GLOB SERPL: 0.9 RATIO (ref 1.1–2)
ALP SERPL-CCNC: 62 UNIT/L (ref 40–150)
ALT SERPL-CCNC: 45 UNIT/L (ref 0–55)
ANION GAP SERPL CALC-SCNC: 10 MEQ/L
AST SERPL-CCNC: 63 UNIT/L (ref 11–45)
BASOPHILS # BLD AUTO: 0.03 X10(3)/MCL
BASOPHILS NFR BLD AUTO: 0.5 %
BILIRUB SERPL-MCNC: 0.7 MG/DL
BUN SERPL-MCNC: 17.5 MG/DL (ref 8.4–25.7)
CALCIUM SERPL-MCNC: 9.3 MG/DL (ref 8.8–10)
CHLORIDE SERPL-SCNC: 108 MMOL/L (ref 98–107)
CO2 SERPL-SCNC: 22 MMOL/L (ref 23–31)
CREAT SERPL-MCNC: 0.88 MG/DL (ref 0.72–1.25)
CREAT/UREA NIT SERPL: 20
EOSINOPHIL # BLD AUTO: 0.13 X10(3)/MCL (ref 0–0.9)
EOSINOPHIL NFR BLD AUTO: 2.2 %
ERYTHROCYTE [DISTWIDTH] IN BLOOD BY AUTOMATED COUNT: 15.1 % (ref 11.5–17)
GFR SERPLBLD CREATININE-BSD FMLA CKD-EPI: >60 ML/MIN/1.73/M2
GLOBULIN SER-MCNC: 4 GM/DL (ref 2.4–3.5)
GLUCOSE SERPL-MCNC: 78 MG/DL (ref 82–115)
HCT VFR BLD AUTO: 43.7 % (ref 42–52)
HGB BLD-MCNC: 14.4 G/DL (ref 14–18)
IMM GRANULOCYTES # BLD AUTO: 0.02 X10(3)/MCL (ref 0–0.04)
IMM GRANULOCYTES NFR BLD AUTO: 0.3 %
LYMPHOCYTES # BLD AUTO: 1.68 X10(3)/MCL (ref 0.6–4.6)
LYMPHOCYTES NFR BLD AUTO: 28.9 %
MCH RBC QN AUTO: 27.4 PG (ref 27–31)
MCHC RBC AUTO-ENTMCNC: 33 G/DL (ref 33–36)
MCV RBC AUTO: 83.1 FL (ref 80–94)
MONOCYTES # BLD AUTO: 0.63 X10(3)/MCL (ref 0.1–1.3)
MONOCYTES NFR BLD AUTO: 10.8 %
NEUTROPHILS # BLD AUTO: 3.32 X10(3)/MCL (ref 2.1–9.2)
NEUTROPHILS NFR BLD AUTO: 57.3 %
NRBC BLD AUTO-RTO: 0 %
PLATELET # BLD AUTO: 169 X10(3)/MCL (ref 130–400)
PMV BLD AUTO: 10.7 FL (ref 7.4–10.4)
POTASSIUM SERPL-SCNC: 4.4 MMOL/L (ref 3.5–5.1)
PROT SERPL-MCNC: 7.7 GM/DL (ref 5.8–7.6)
RBC # BLD AUTO: 5.26 X10(6)/MCL (ref 4.7–6.1)
SODIUM SERPL-SCNC: 140 MMOL/L (ref 136–145)
WBC # BLD AUTO: 5.81 X10(3)/MCL (ref 4.5–11.5)

## 2025-06-09 PROCEDURE — 36415 COLL VENOUS BLD VENIPUNCTURE: CPT

## 2025-06-09 PROCEDURE — 1101F PT FALLS ASSESS-DOCD LE1/YR: CPT | Mod: CPTII,,, | Performed by: NURSE PRACTITIONER

## 2025-06-09 PROCEDURE — 99214 OFFICE O/P EST MOD 30 MIN: CPT | Mod: PBBFAC | Performed by: NURSE PRACTITIONER

## 2025-06-09 PROCEDURE — 3288F FALL RISK ASSESSMENT DOCD: CPT | Mod: CPTII,,, | Performed by: NURSE PRACTITIONER

## 2025-06-09 PROCEDURE — 1126F AMNT PAIN NOTED NONE PRSNT: CPT | Mod: CPTII,,, | Performed by: NURSE PRACTITIONER

## 2025-06-09 PROCEDURE — 80053 COMPREHEN METABOLIC PANEL: CPT

## 2025-06-09 PROCEDURE — 1160F RVW MEDS BY RX/DR IN RCRD: CPT | Mod: CPTII,,, | Performed by: NURSE PRACTITIONER

## 2025-06-09 PROCEDURE — 1159F MED LIST DOCD IN RCRD: CPT | Mod: CPTII,,, | Performed by: NURSE PRACTITIONER

## 2025-06-09 PROCEDURE — 3078F DIAST BP <80 MM HG: CPT | Mod: CPTII,,, | Performed by: NURSE PRACTITIONER

## 2025-06-09 PROCEDURE — 3074F SYST BP LT 130 MM HG: CPT | Mod: CPTII,,, | Performed by: NURSE PRACTITIONER

## 2025-06-09 PROCEDURE — 99213 OFFICE O/P EST LOW 20 MIN: CPT | Mod: S$PBB,,, | Performed by: NURSE PRACTITIONER

## 2025-06-09 PROCEDURE — 87522 HEPATITIS C REVRS TRNSCRPJ: CPT

## 2025-06-09 PROCEDURE — 85025 COMPLETE CBC W/AUTO DIFF WBC: CPT

## 2025-06-09 NOTE — PROGRESS NOTES
Patient ID: Joanna Cancino 80 y.o.     Chief Complaint:   Chief Complaint   Patient presents with    Followup Hep C     Denies problems        HPI:    6/9/25  Mr. Box is a 81 yo AAM here today for HCV f/u visit. He has completed 12 week treatment course of Epclusa and tolerated it well. He resumed atorvastatin 40 mg dose today as well. He is feeling well and has no concerns or questions today.  No identifiable risk factors for HCV re-exposure. Will check end of treatment labs today.     4/17/25  Mr. Cancino is a 80 yo AAM presenting today for HCV f/u visit.  He started Epclusa on 3/10/25 as prescribed last visit. He has been taking daily and is tolerating it well without any noted side effects. He has stopped drinking beer, switched to NA formulation. He is feeling and is amenable to 2nd dose of Heplisav-B vaccine today. Will defer Shingrix due to holiday weekend, but is amenable to same. He denies jaundice, icterus, nausea, vomiting, dark urine, or bob colored stools.  He has no questions or concerns today.      1/8/25   is a 80 yo AAM here today for HCV f/u visit.  Labs collected 12/9/24, here for results and is ready to proceed with HCV treatment as well. He is immune to Hep A, non-immune to Hep B. Appreciates vaccination for Hep B, 1st dose today. He is currently taking atorvastatin 40 mg daily, will dose reduce to 20 mg per day while on Epclusa only.  He is eager to start treatment and eradicate HCV.  He agrees to adhere to treatment recommendations and remains clean and sober. All questions answered & concerns addressed.     12/9/24  Mr. Box is a 80 yo AAM referred to [facility] by PCP Dr. Jameson 11/8/24 for HCV evaluation.  He tells me that he was 1st diagnosed with HCV >20 years ago and is treatment naive. He has a history significant for home tattoo and ear piercing. He denies any history of IVDU, or any illicit drug use, no known HCV + sexual contacts.  He has received a blood transfusion in  recent years, post HCV diagnosis. He does socially drink beer, 2-3 per day. Labs collected  AST 28, ALT 16.  HIV NR, Hep B negative & non-immune.  He denies jaundice, icterus, nausea, vomiting, dark urine, or bob colored stools.  He appreciates flu & pneumonia vaccinations today as recommended. RUQ abd u/s completed 24 with coarsened echotexture of liver noted. Will collect labs today for confirmation of active HCV and complete pre-treatment labs. All questions answered & concerns addressed.              Past Medical History:   Diagnosis Date    HLD (hyperlipidemia)     HTN (hypertension)     PAD (peripheral artery disease)     Past heart attack         Past Surgical History:   Procedure Laterality Date    CORONARY ANGIOPLASTY WITH STENT PLACEMENT      HERNIA REPAIR      PERIPHERAL ANGIOGRAPHY N/A 4/10/2024    Procedure: Peripheral angiography;  Surgeon: Alex Crawford MD;  Location: [facility] CATH [facility];  Service: Cardiology;  Laterality: N/A;  BIRGIT ANGIO *WILL NEED GORE & SHOCKWAVE*    PROSTATE SURGERY          Social History     Socioeconomic History    Marital status: Single   Tobacco Use    Smoking status: Former     Current packs/day: 0.00     Types: Cigarettes     Quit date:      Years since quittin.4    Smokeless tobacco: Never    Tobacco comments:     Stopped in    Substance and Sexual Activity    Alcohol use: Not Currently     Alcohol/week: 2.0 standard drinks of alcohol     Types: 2 Cans of beer per week     Comment: daily, low alcohol content drinks    Drug use: Never    Sexual activity: Not Currently     Partners: Female     Comment: No relationship     Social Drivers of Health     Financial Resource Strain: Low Risk  (2025)    Overall Financial Resource Strain (CARDIA)     Difficulty of Paying Living Expenses: Not hard at all   Food Insecurity: No Food Insecurity (2025)    Hunger Vital Sign     Worried About Running Out of Food in the Last Year: Never true     Ran Out of  Food in the Last Year: Never true   Transportation Needs: No Transportation Needs (5/21/2025)    PRAPARE - Transportation     Lack of Transportation (Medical): No     Lack of Transportation (Non-Medical): No   Physical Activity: Insufficiently Active (5/21/2025)    Exercise Vital Sign     Days of Exercise per Week: 7 days     Minutes of Exercise per Session: 20 min   Stress: No Stress Concern Present (5/21/2025)    [employer] - Occupational Stress Questionnaire     Feeling of Stress : Not at all   Housing Stability: Low Risk  (5/21/2025)    Housing Stability Vital Sign     Unable to Pay for Housing in the Last Year: No     Homeless in the Last Year: No        Family History   Problem Relation Name Age of Onset    Kidney disease Father      Heart disease Father          Review of patient's allergies indicates:  No Known Allergies     Immunization History   Administered Date(s) Administered    COVID-19, MRNA, LN-S, PF (MODERNA FULL 0.5 ML DOSE) 03/17/2021, 04/14/2021    Hepatitis B (recombinant) Adjuvanted, 2 dose 01/08/2025, 04/17/2025    Influenza - Trivalent - Fluad - Adjuvanted - PF (65 years and older 12/09/2024    Pneumococcal Conjugate - 20 Valent 12/09/2024        Review of Systems   Constitutional: Negative.    HENT: Negative.     Eyes: Negative.    Respiratory: Negative.     Cardiovascular: Negative.    Gastrointestinal: Negative.    Genitourinary: Negative.    Musculoskeletal: Negative.    Skin: Negative.    Neurological: Negative.    Endo/Heme/Allergies: Negative.    Psychiatric/Behavioral: Negative.     All other systems reviewed and are negative.         Objective:      /72 (BP Location: Left arm, Patient Position: Sitting)   Pulse 78   Temp 97.7 °F (36.5 °C) (Oral)   Resp 10   Ht 6' (1.829 m)   Wt 70.9 kg (156 lb 6.7 oz)   BMI 21.21 kg/m²      Physical Exam  Vitals reviewed.   Constitutional:       General: He is not in acute distress.     Appearance: Normal  appearance. He is not toxic-appearing.   Eyes:      General: No scleral icterus.  Cardiovascular:      Rate and Rhythm: Normal rate and regular rhythm.      Heart sounds: Normal heart sounds.   Pulmonary:      Effort: Pulmonary effort is normal. No respiratory distress.      Breath sounds: Normal breath sounds.   Abdominal:      General: Bowel sounds are normal. There is no distension.      Palpations: Abdomen is soft. There is no mass.      Tenderness: There is no abdominal tenderness.   Musculoskeletal:         General: Normal range of motion.   Skin:     General: Skin is warm and dry.   Neurological:      Mental Status: He is alert and oriented to person, place, and time.          Labs:   Lab Results   Component Value Date    WBC 6.49 12/09/2024    HGB 14.1 12/09/2024    HCT 41.1 (L) 12/09/2024    MCV 83.7 12/09/2024     12/09/2024       CMP  Sodium   Date Value Ref Range Status   12/09/2024 136 136 - 145 mmol/L Final     Potassium   Date Value Ref Range Status   12/09/2024 4.1 3.5 - 5.1 mmol/L Final     Chloride   Date Value Ref Range Status   12/09/2024 103 98 - 107 mmol/L Final     CO2   Date Value Ref Range Status   12/09/2024 25 23 - 31 mmol/L Final     Glucose   Date Value Ref Range Status   12/09/2024 95 82 - 115 mg/dL Final     Blood Urea Nitrogen   Date Value Ref Range Status   12/09/2024 12.7 8.4 - 25.7 mg/dL Final     Creatinine   Date Value Ref Range Status   12/09/2024 0.91 0.72 - 1.25 mg/dL Final     Calcium   Date Value Ref Range Status   12/09/2024 9.0 8.8 - 10.0 mg/dL Final     Protein Total   Date Value Ref Range Status   12/09/2024 6.9 5.8 - 7.6 gm/dL Final     Albumin   Date Value Ref Range Status   12/09/2024 3.6 3.4 - 4.8 g/dL Final     Bilirubin Total   Date Value Ref Range Status   12/09/2024 0.8 <=1.5 mg/dL Final     ALP   Date Value Ref Range Status   12/09/2024 57 40 - 150 unit/L Final     AST   Date Value Ref Range Status   12/09/2024 48 (H) 5 - 34 unit/L Final     ALT   Date  Value Ref Range Status   12/09/2024 36 0 - 55 unit/L Final     eGFR   Date Value Ref Range Status   12/09/2024 >60 mL/min/1.73/m2 Final     Lab Results   Component Value Date    TSH 6.000 (H) 04/05/2019     HCV Genotype   Date Value Ref Range Status   12/09/2024 2 (A) Undetected Final     Comment:        -------------------ADDITIONAL INFORMATION-------------------  This test was performed using the Abbott RealTime HCV   Genotype II assay ([employer] [place], [place]).     Test Performed by:  [place] [address]  : Angelica Ro Ph.D.; CLIA# [id number]     HCV RNA Detect/Quant   Date Value Ref Range Status   12/09/2024 1254438 (A) Undetected IU/mL Final     Comment:     Result in log IU/mL is 7.00.     -------------------ADDITIONAL INFORMATION-------------------  The quantification range of this assay is 15 to 100,000,000   IU/mL (1.18 log to 8.00 log IU/mL). Testing was performed   using the rangel HCV test ([employer]     Test Performed by:  [place] [address]  : Angelica Ro Ph.D.; CLIA# [id number]     HIV   Date Value Ref Range Status   11/08/2024 Nonreactive Nonreactive Final     PT   Date Value Ref Range Status   12/09/2024 14.3 (H) 11.4 - 14.0 seconds Final     INR   Date Value Ref Range Status   12/09/2024 1.1 <=1.3 Final     Syphilis Antibody   Date Value Ref Range Status   12/09/2024 Nonreactive Nonreactive, Equivocal Final     Hep BsAb Interp   Date Value Ref Range Status   11/08/2024 Nonreactive Nonreactive Final     Hep BsAb   Date Value Ref Range Status   11/08/2024 0.11 mIU/mL Final     Comment:     Interpretive Data Hep Bs Ab#  Result (mIU/mL)Interpretation - Hep B Surface Antibody  <8.00Nonreactive: Patient considered not immune to HBV infection  >=8.00 to <12.00[place]: Unable to  determine immune status. Follow-up testing should be performed  >=12.00Reactive: Patient considered immune to HBV infection       Hep BcAb Interp   Date Value Ref Range Status   11/08/2024 Nonreactive Nonreactive Final     Hep A IgG Interp   Date Value Ref Range Status   12/09/2024 Reactive (A) Nonreactive Final     FibroTest Stage   Date Value Ref Range Status   12/09/2024 F1-F2  Final     ActiTest Grade   Date Value Ref Range Status   12/09/2024 A0-A1  Final     Alpha-2-Macroglobulin   Date Value Ref Range Status   12/09/2024 225 100 - 280 mg/dL Final     Haptoglobin   Date Value Ref Range Status   12/09/2024 148 30 - 200 mg/dL Final     Alanine Aminotransferase (ALT)   Date Value Ref Range Status   12/09/2024 42 7 - 55 U/L Final     Gamma Glutamyltransferase (GGT)   Date Value Ref Range Status   12/09/2024 22 8 - 61 U/L Final     Bilirubin, Total   Date Value Ref Range Status   12/09/2024 0.7 0.0 - 1.2 mg/dL Final     Comment:        Test Performed by:  [place] [address]  : Angelica Ro Ph.D.; CLIA# [id number]     Test Performed by:  [place] [address]  : Angelica Ro Ph.D.; CLIA# [id number]     Ferritin Level   Date Value Ref Range Status   12/09/2024 95.52 21.81 - 274.66 ng/mL Final     Antinuclear Ab, HEp-2 Substrate   Date Value Ref Range Status   12/09/2024 <1:80 (Negative) <1:80 (Negative) Final     Comment:        -------------------ADDITIONAL INFORMATION-------------------  Method: Immunofluorescence using HEp-2 cellular substrate.     Test Performed by:  [place] [address]  : Angelica Ro Ph.D.; CLIA# [id number]       Imaging: Reviewed most recent relevant imaging studies available, notable results highlighted in this  note      Medications:     Current Outpatient Medications   Medication Instructions    acetaminophen (TYLENOL) 650 mg, Oral, Every 4 hours PRN    aspirin (ECOTRIN) 81 mg, Daily    atorvastatin (LIPITOR) 40 MG tablet     clopidogreL (PLAVIX) 75 mg, Oral, Daily    COMBIGAN 0.2-0.5 % Drop 1 drop, 2 times daily    finasteride (PROSCAR) 5 mg, Daily    fluticasone propionate (FLONASE) 50 mcg, Each Nostril, 2 times daily PRN    levocetirizine (XYZAL) 5 MG tablet 1 tablet, Daily    metoprolol tartrate (LOPRESSOR) 25 mg, 2 times daily    multivitamin (THERAGRAN) per tablet 1 tablet, Daily    ranolazine (RANEXA) 500 MG Tb12 1 tablet, 2 times daily    sildenafiL (VIAGRA) 100 mg, Daily PRN    VYZULTA 0.024 % Drop 1 drop, Nightly       Assessment:       Problem List Items Addressed This Visit    None  Visit Diagnoses         Chronic hepatitis C without hepatic coma    -  Primary               Plan:      Chronic hepatitis C without hepatic coma  Diagnosed approximately 2000 or earlier.  Treatment naive.  GT 2, baseline VL 9.94 mil copies (12/9/24).  Fibrosure: A0-1, F1-2 (12/9/24).  FibroScan: S0, F0-1  (12/13/24).  RUQ abdominal u/s: 11/27/24 coarsened echotexture of liver.  Blood precautions: do not share a razor, needle, toothbrush, clippers with anyone.  Completed Epclusa 1 po daily x 12 weeks, 3/10/25-6/2/25.  End of treatment labs today.   Resumed atorvastatin 40 mg daily.   RTC 3 months with Shyanne

## 2025-06-11 LAB — HCV RNA SERPL NAA+PROBE-ACNC: NORMAL IU/ML

## 2025-07-21 RX ORDER — LEVOCETIRIZINE DIHYDROCHLORIDE 5 MG/1
5 TABLET, FILM COATED ORAL DAILY
Qty: 30 TABLET | Refills: 4 | Status: SHIPPED | OUTPATIENT
Start: 2025-07-21

## (undated) DEVICE — SHEATH INTRODUCER 5FR 10CM

## (undated) DEVICE — SET ANGIO ACIST CVI ANGIOTOUCH

## (undated) DEVICE — GUIDEWIRE GLADIUS STR 300CM

## (undated) DEVICE — SHEATH BRITE TIP 7FR 23CM

## (undated) DEVICE — CATH ANGIO OMNI W/10SH 5F .035

## (undated) DEVICE — CATH PV .018

## (undated) DEVICE — CATH ULTRAVERSE 035 9X20X75

## (undated) DEVICE — GUIDEWIRE TORQUE 3CM/180CML

## (undated) DEVICE — PAD DEFIB CADENCE ADULT R2

## (undated) DEVICE — Device

## (undated) DEVICE — CANNULA NASAL ADULT

## (undated) DEVICE — GUIDEWIRE INQWIRE SE 3MM JTIP